# Patient Record
Sex: MALE | Race: WHITE | NOT HISPANIC OR LATINO | Employment: FULL TIME | URBAN - METROPOLITAN AREA
[De-identification: names, ages, dates, MRNs, and addresses within clinical notes are randomized per-mention and may not be internally consistent; named-entity substitution may affect disease eponyms.]

---

## 2017-01-26 ENCOUNTER — COMMUNICATION - HEALTHEAST (OUTPATIENT)
Dept: FAMILY MEDICINE | Facility: CLINIC | Age: 47
End: 2017-01-26

## 2017-02-22 ENCOUNTER — COMMUNICATION - HEALTHEAST (OUTPATIENT)
Dept: FAMILY MEDICINE | Facility: CLINIC | Age: 47
End: 2017-02-22

## 2017-04-06 ENCOUNTER — COMMUNICATION - HEALTHEAST (OUTPATIENT)
Dept: FAMILY MEDICINE | Facility: CLINIC | Age: 47
End: 2017-04-06

## 2017-04-06 DIAGNOSIS — I10 ESSENTIAL HYPERTENSION: ICD-10-CM

## 2017-04-18 ENCOUNTER — COMMUNICATION - HEALTHEAST (OUTPATIENT)
Dept: SCHEDULING | Facility: CLINIC | Age: 47
End: 2017-04-18

## 2017-04-18 DIAGNOSIS — I10 ESSENTIAL HYPERTENSION: ICD-10-CM

## 2017-05-17 ENCOUNTER — COMMUNICATION - HEALTHEAST (OUTPATIENT)
Dept: FAMILY MEDICINE | Facility: CLINIC | Age: 47
End: 2017-05-17

## 2017-05-17 DIAGNOSIS — I10 ESSENTIAL HYPERTENSION: ICD-10-CM

## 2017-06-01 NOTE — PATIENT INSTRUCTIONS
ANIBALI: JOANIE WILL BE OUT OF THE CLINIC FROM [place] 15 THROUGH JULY 4.  Any calls/Mychart messages, refill requests, and urgent lab results will be forwarded to her colleagues in her absence.

## 2017-06-02 ENCOUNTER — OFFICE VISIT (OUTPATIENT)
Dept: FAMILY MEDICINE | Facility: CLINIC | Age: 47
End: 2017-06-02
Payer: COMMERCIAL

## 2017-06-02 VITALS
WEIGHT: 227 LBS | HEIGHT: 70 IN | HEART RATE: 87 BPM | TEMPERATURE: 98.9 F | OXYGEN SATURATION: 97 % | DIASTOLIC BLOOD PRESSURE: 88 MMHG | SYSTOLIC BLOOD PRESSURE: 134 MMHG | BODY MASS INDEX: 32.5 KG/M2

## 2017-06-02 DIAGNOSIS — E11.9 TYPE 2 DIABETES MELLITUS WITHOUT COMPLICATION, WITHOUT LONG-TERM CURRENT USE OF INSULIN (H): ICD-10-CM

## 2017-06-02 DIAGNOSIS — I10 ESSENTIAL HYPERTENSION WITH GOAL BLOOD PRESSURE LESS THAN 140/90: ICD-10-CM

## 2017-06-02 DIAGNOSIS — E78.5 HYPERLIPIDEMIA LDL GOAL <100: Primary | ICD-10-CM

## 2017-06-02 DIAGNOSIS — R22.9 SUBCUTANEOUS MASS: ICD-10-CM

## 2017-06-02 PROCEDURE — 99207 C FOOT EXAM  NO CHARGE: CPT | Performed by: PHYSICIAN ASSISTANT

## 2017-06-02 PROCEDURE — 99203 OFFICE O/P NEW LOW 30 MIN: CPT | Performed by: PHYSICIAN ASSISTANT

## 2017-06-02 RX ORDER — TRIAMTERENE/HYDROCHLOROTHIAZID 37.5-25 MG
1 TABLET ORAL DAILY
COMMUNITY
Start: 2017-05-18 | End: 2017-06-02

## 2017-06-02 RX ORDER — TRIAMTERENE/HYDROCHLOROTHIAZID 37.5-25 MG
1 TABLET ORAL DAILY
Qty: 90 TABLET | Refills: 3 | Status: SHIPPED | OUTPATIENT
Start: 2017-06-02 | End: 2018-08-23

## 2017-06-02 RX ORDER — ATORVASTATIN CALCIUM 20 MG/1
20 TABLET, FILM COATED ORAL DAILY
COMMUNITY
Start: 2017-05-18 | End: 2017-06-02

## 2017-06-02 RX ORDER — ATORVASTATIN CALCIUM 20 MG/1
20 TABLET, FILM COATED ORAL DAILY
Qty: 90 TABLET | Refills: 3 | Status: SHIPPED | OUTPATIENT
Start: 2017-06-02 | End: 2018-08-17

## 2017-06-02 NOTE — NURSING NOTE
"Chief Complaint   Patient presents with     Recheck Medication       Initial BP (!) 148/95  Pulse 87  Temp 98.9  F (37.2  C) (Oral)  Ht 5' 9.69\" (1.77 m)  Wt 227 lb (103 kg)  SpO2 97%  BMI 32.87 kg/m2 Estimated body mass index is 32.87 kg/(m^2) as calculated from the following:    Height as of this encounter: 5' 9.69\" (1.77 m).    Weight as of this encounter: 227 lb (103 kg).  Medication Reconciliation: complete     Sylvie Sanabria MA  "

## 2017-06-02 NOTE — LETTER
This is a reminder letter.     In order to ensure we are providing the best quality care, we have reviewed your chart and see that you are due for: a fasting lab only appointment.     1.   Albumin Urine  2.   TSH w/free T4 reflex  3.   Hemoglobin A1C  4. Comprehensive Metabolic Panel   5. Lipid Panel     For fasting labs please fast for at least 10 hours. You can still take your medications and have water.    We greatly appreciate the opportunity to serve you.  Thank you for trusting us with your health care.    If you are now being seen elsewhere please let us know and we will remove you from the list.    Sincerely,    The [place] Team

## 2017-06-02 NOTE — MR AVS SNAPSHOT
After Visit Summary   6/2/2017    Robert Glynn    MRN: [id number]           Patient Information     Date Of Birth          1970        Visit Information        Provider Department      6/2/2017 2:40 PM Dea Ibanez PA-C [facility] [place]        Today's Diagnoses     Hyperlipidemia LDL goal <100    -  1    Essential hypertension with goal blood pressure less than 140/90        Type 2 diabetes mellitus without complication, without long-term current use of insulin (H)          Care Instructions    FYI: DEA WILL BE OUT OF THE CLINIC FROM [place] 15 THROUGH JULY 4.  Any calls/Mychart messages, refill requests, and urgent lab results will be forwarded to her colleagues in her absence.             Follow-ups after your visit        Additional Services     OPHTHALMOLOGY ADULT REFERRAL       Your provider has referred you to: [facility]: [facility] - [place] [phone]   [email or web address]    Please be aware that coverage of these services is subject to the terms and limitations of your health insurance plan.  Call member services at your health plan with any benefit or coverage questions.      Please bring the following with you to your appointment:    (1) Any X-Rays, CTs or MRIs which have been performed.  Contact the facility where they were done to arrange for  prior to your scheduled appointment.    (2) List of current medications  (3) This referral request   (4) Any documents/labs given to you for this referral                  Future tests that were ordered for you today     Open Future Orders        Priority Expected Expires Ordered    Comprehensive metabolic panel Routine  9/2/2017 6/2/2017    Lipid panel reflex to direct LDL Routine  9/2/2017 6/2/2017    Albumin Random Urine Quantitative Routine  9/2/2017 6/2/2017    Hemoglobin A1c Routine  9/2/2017 6/2/2017            Who to contact     Normal or non-critical lab and imaging results will be  "communicated to you by Tradescapehart, letter or phone within 4 business days after the clinic has received the results. If you do not hear from us within 7 days, please contact the clinic through Kinnekt or phone. If you have a critical or abnormal lab result, we will notify you by phone as soon as possible.  Submit refill requests through [facility] or call your pharmacy and they will forward the refill request to us. Please allow 3 business days for your refill to be completed.          If you need to speak with a  for additional information , please call: [phone]             Additional Information About Your Visit        [facility] Information     [facility] lets you send messages to your doctor, view your test results, renew your prescriptions, schedule appointments and more. To sign up, go to [email or web address] . Click on \"Log in\" on the left side of the screen, which will take you to the Welcome page. Then click on \"Sign up Now\" on the right side of the page.     You will be asked to enter the access code listed below, as well as some personal information. Please follow the directions to create your username and password.     Your access code is: [email or web address]  Expires: 2017  2:48 PM     Your access code will  in 90 days. If you need help or a new code, please call your [place] clinic or [phone].        Care EveryWhere ID     This is your Care EveryWhere ID. This could be used by other organizations to access your [place] medical records  [id number]        Your Vitals Were     Pulse Temperature Height Pulse Oximetry BMI (Body Mass Index)       87 98.9  F (37.2  C) (Oral) 5' 9.69\" (1.77 m) 97% 32.87 kg/m2        Blood Pressure from Last 3 Encounters:   17 (!) 148/95    Weight from Last 3 Encounters:   17 227 lb (103 kg)              We Performed the Following     C FOOT EXAM  NO CHARGE     OPHTHALMOLOGY ADULT REFERRAL          Today's Medication Changes          These " changes are accurate as of: 6/2/17  2:48 PM.  If you have any questions, ask your nurse or doctor.               These medicines have changed or have updated prescriptions.        Dose/Directions    atorvastatin 20 MG tablet   Commonly known as:  LIPITOR   This may have changed:  how to take this   Used for:  Hyperlipidemia LDL goal <100   Changed by:  Dea Ibanez PA-C        Dose:  20 mg   Take 1 tablet (20 mg) by mouth daily   Quantity:  90 tablet   Refills:  3       triamterene-hydrochlorothiazide 37.5-25 MG per tablet   Commonly known as:  MAXZIDE-25   This may have changed:  how to take this   Used for:  Essential hypertension with goal blood pressure less than 140/90   Changed by:  Dea Ibanez PA-C        Dose:  1 tablet   Take 1 tablet by mouth daily   Quantity:  90 tablet   Refills:  3            Where to get your medicines      These medications were sent to [facility] - [id number]  [address] [address] [address]     Phone:  [phone]     atorvastatin 20 MG tablet    triamterene-hydrochlorothiazide 37.5-25 MG per tablet                Primary Care Provider Office Phone #    [facility] [phone]       No address on file        Thank you!     Thank you for choosing [facility]  for your care. Our goal is always to provide you with excellent care. Hearing back from our patients is one way we can continue to improve our services. Please take a few minutes to complete the written survey that you may receive in the mail after your visit with us. Thank you!             Your Updated Medication List - Protect others around you: Learn how to safely use, store and throw away your medicines at www.disposemymeds.org.          This list is accurate as of: 6/2/17  2:48 PM.  Always use your most recent med list.                   Brand Name Dispense Instructions for use    aspirin 81 MG tablet      Take by mouth daily        atorvastatin 20 MG tablet    LIPITOR    90 tablet    Take 1 tablet (20 mg) by mouth daily       CINNAMON PO          triamterene-hydrochlorothiazide 37.5-25 MG per tablet    MAXZIDE-25    90 tablet    Take 1 tablet by mouth daily

## 2017-06-02 NOTE — PROGRESS NOTES
SUBJECTIVE:                                                    Robert Glynn is a 46 year old male who presents to clinic today for the following health issues:    New Patient/Transfer of Care  Hyperlipidemia Follow-Up      Rate your low fat/cholesterol diet?: not monitoring fat    Taking statin?  Yes, no muscle aches from statin    Other lipid medications/supplements?:  none     Hypertension Follow-up      Outpatient blood pressures are not being checked.    Low Salt Diet: no added salt       Amount of exercise or physical activity: patient has fit bit-does 10 miles a day    Problems taking medications regularly: No    Medication side effects: none    Diet: low salt        Problem list and histories reviewed & adjusted, as indicated.  Additional history: as documented    Patient Active Problem List   Diagnosis     Type 2 diabetes mellitus without complication, without long-term current use of insulin (H)     Essential hypertension with goal blood pressure less than 140/90     Hyperlipidemia LDL goal <100     History reviewed. No pertinent surgical history.    Social History   Substance Use Topics     Smoking status: Never Smoker     Smokeless tobacco: Not on file     Alcohol use Yes      Comment: occ     Family History   Problem Relation Age of Onset     Hypertension Mother      Hypertension Father      Hypertension Maternal Grandmother      Hypertension Maternal Grandfather      Hypertension Paternal Grandmother      Hypertension Paternal Grandfather      Hypertension Brother      Hypertension Brother      Hypertension Brother            Reviewed and updated as needed this visit by clinical staff  Tobacco  Allergies  Meds  Med Hx  Surg Hx  Fam Hx  Soc Hx      Reviewed and updated as needed this visit by Provider         ROS:  Constitutional, neuro, endocrine, cardiac, integument systems are negative, except as otherwise noted.    OBJECTIVE:                                                    /88   "Pulse 87  Temp 98.9  F (37.2  C) (Oral)  Ht 5' 9.69\" (1.77 m)  Wt 227 lb (103 kg)  SpO2 97%  BMI 32.87 kg/m2  Body mass index is 32.87 kg/(m^2).  GENERAL APPEARANCE: healthy, alert and no distress  MS: extremities normal- no gross deformities noted  DIABETIC FOOT EXAM: normal DP and PT pulses, no trophic changes or ulcerative lesions, normal sensory exam and normal monofilament exam  SKIN: 1.5cm fixed subcutaneous mass of left cheek, non tender  PSYCH: mentation appears normal and affect normal/bright       ASSESSMENT:                                                      1. Hyperlipidemia LDL goal <100    2. Essential hypertension with goal blood pressure less than 140/90    3. Type 2 diabetes mellitus without complication, without long-term current use of insulin (H)    4. Subcutaneous mass         PLAN:                                                    Labs in near future. Meds renewed, no changes. Patient is doing well overall. A1c through lab in 6 months. Follow up annually.    Will talk to radiology regarding imaging for the subQ mass on his cheek.    Patient Instructions   FYI: JOANIE WILL BE OUT OF THE CLINIC FROM [place] 15 THROUGH JULY 4.  Any calls/Mychart messages, refill requests, and urgent lab results will be forwarded to her colleagues in her absence.        The patient was in agreement with the plan today and had no questions or concerns prior to leaving the clinic.     Joanie Ibanez PA-C  [facility]    "

## 2017-06-05 DIAGNOSIS — R22.9 SUBCUTANEOUS MASS: Primary | ICD-10-CM

## 2017-06-16 ENCOUNTER — RADIANT APPOINTMENT (OUTPATIENT)
Dept: CT IMAGING | Facility: CLINIC | Age: 47
End: 2017-06-16
Attending: PHYSICIAN ASSISTANT
Payer: COMMERCIAL

## 2017-06-16 DIAGNOSIS — R22.9 SUBCUTANEOUS MASS: ICD-10-CM

## 2017-06-16 PROCEDURE — 70487 CT MAXILLOFACIAL W/DYE: CPT | Mod: TC

## 2017-06-16 RX ORDER — IOPAMIDOL 755 MG/ML
90 INJECTION, SOLUTION INTRAVASCULAR ONCE
Status: COMPLETED | OUTPATIENT
Start: 2017-06-16 | End: 2017-06-16

## 2017-06-16 RX ADMIN — IOPAMIDOL 90 ML: 755 INJECTION, SOLUTION INTRAVASCULAR at 15:01

## 2018-08-06 ENCOUNTER — OFFICE VISIT (OUTPATIENT)
Dept: ORTHOPEDICS | Facility: CLINIC | Age: 48
End: 2018-08-06
Payer: COMMERCIAL

## 2018-08-06 ENCOUNTER — RADIANT APPOINTMENT (OUTPATIENT)
Dept: GENERAL RADIOLOGY | Facility: CLINIC | Age: 48
End: 2018-08-06
Attending: PEDIATRICS
Payer: COMMERCIAL

## 2018-08-06 VITALS
HEIGHT: 71 IN | DIASTOLIC BLOOD PRESSURE: 64 MMHG | BODY MASS INDEX: 32.48 KG/M2 | WEIGHT: 232 LBS | SYSTOLIC BLOOD PRESSURE: 124 MMHG

## 2018-08-06 DIAGNOSIS — S92.912A FRACTURE OF PROXIMAL PHALANX OF TOE OF LEFT FOOT: Primary | ICD-10-CM

## 2018-08-06 DIAGNOSIS — M79.675 PAIN OF TOE OF LEFT FOOT: ICD-10-CM

## 2018-08-06 PROCEDURE — 99204 OFFICE O/P NEW MOD 45 MIN: CPT | Performed by: PEDIATRICS

## 2018-08-06 PROCEDURE — 73630 X-RAY EXAM OF FOOT: CPT | Mod: LT

## 2018-08-06 NOTE — MR AVS SNAPSHOT
"              After Visit Summary   8/6/2018    Robert Glynn    MRN: [id number]           Patient Information     Date Of Birth          1970        Visit Information        Provider Department      8/6/2018 3:00 PM Felicia Rosenthal MD [facility] [place]        Today's Diagnoses     Fracture of proximal phalanx of toe of left foot    -  1      Care Instructions    Plan:  - Today's Plan of Care:  Jani tape   Cast shoe    Follow Up: 2-3 weeks    If you have any further questions for your physician or physician s care team you can call [phone] and use option 3 to leave a voice message. Calls received during business hours will be returned same day.              Follow-ups after your visit        Who to contact     If you have questions or need follow up information about today's clinic visit or your schedule please contact [facility] [place] directly at [phone].  Normal or non-critical lab and imaging results will be communicated to you by Scannxhart, letter or phone within 4 business days after the clinic has received the results. If you do not hear from us within 7 days, please contact the clinic through Scannxhart or phone. If you have a critical or abnormal lab result, we will notify you by phone as soon as possible.  Submit refill requests through Pantry or call your pharmacy and they will forward the refill request to us. Please allow 3 business days for your refill to be completed.          Additional Information About Your Visit        Scannxhart Information     Pantry lets you send messages to your doctor, view your test results, renew your prescriptions, schedule appointments and more. To sign up, go to [email or web address] . Click on \"Log in\" on the left side of the screen, which will take you to the Welcome page. Then click on \"Sign up Now\" on the right side of the page.     You will be asked to enter the access code listed below, as well as some " "personal information. Please follow the directions to create your username and password.     Your access code is: [email or web address]  Expires: 2018  3:49 PM     Your access code will  in 90 days. If you need help or a new code, please call your [place] clinic or [phone].        Care EveryWhere ID     This is your Care EveryWhere ID. This could be used by other organizations to access your [place] medical records  [id number]        Your Vitals Were     Height BMI (Body Mass Index)                5' 11\" (1.803 m) 32.36 kg/m2           Blood Pressure from Last 3 Encounters:   18 124/64   17 134/88    Weight from Last 3 Encounters:   18 232 lb (105.2 kg)   17 227 lb (103 kg)               Primary Care Provider Office Phone # Fax #    [facility] [phone] [address] [address]        Equal Access to Services     [facility]: Hadii aad ku hadasho Soomaali, waaxda luqadaha, qaybta kaalmada adeegyada, waxay idiin hayaan lamonte lobato . So [facility] [phone].    ATENCIÓN: Si habla español, tiene a sorensen disposición servicios gratuitos de asistencia lingüística. Llame al [phone].    We comply with applicable federal civil rights laws and [place] laws. We do not discriminate on the basis of race, color, national origin, age, disability, sex, sexual orientation, or gender identity.            Thank you!     Thank you for choosing [facility]  for your care. Our goal is always to provide you with excellent care. Hearing back from our patients is one way we can continue to improve our services. Please take a few minutes to complete the written survey that you may receive in the mail after your visit with us. Thank you!             Your Updated Medication List - Protect others around you: Learn how to safely use, store and throw away your medicines at www.disposemymeds.org.          This list is " accurate as of 8/6/18  3:49 PM.  Always use your most recent med list.                   Brand Name Dispense Instructions for use Diagnosis    aspirin 81 MG tablet      Take by mouth daily        atorvastatin 20 MG tablet    LIPITOR    90 tablet    Take 1 tablet (20 mg) by mouth daily    Hyperlipidemia LDL goal <100       CINNAMON PO           triamterene-hydrochlorothiazide 37.5-25 MG per tablet    MAXZIDE-25    90 tablet    Take 1 tablet by mouth daily    Essential hypertension with goal blood pressure less than 140/90

## 2018-08-06 NOTE — PROGRESS NOTES
Sports Medicine Clinic Visit    PCP: Anup Shaver    Robert LIVE Glynn is a 47 year old male who is seen  as a self referral presenting with left 2nd toe injury.    Injury: He hit toe on molding at home 2 days ago.  Has continued to walk on it, much worse with barefoot.  Has significant swelling and bruising.    Location of Pain: left 2nd toe  Duration of Pain: 8/4/2018 or 2 day(s)  Rating of Pain at worst: 10/10  Rating of Pain Currently: 7/10  Symptoms are better with: wearing shoes  Symptoms are worse with: flexion  Additional Features:   Positive: swelling and bruising   Negative: catching, locking, instability, paresthesias, numbness and weakness  Other evaluation and/or treatments so far consists of: Ice and Aspirin  Prior History of related problems: none    Social History: printing company, standing all day    Review of Systems  Skin: yes bruising, yes swelling  Musculoskeletal: as above  Neurologic: no numbness, paresthesias  Remainder of review of systems is negative including constitutional, CV, pulmonary, GI, except as noted in HPI or medical history.    Patient's current problem list, past medical and surgical history, and family history were reviewed.    Patient Active Problem List   Diagnosis     Type 2 diabetes mellitus without complication, without long-term current use of insulin (H)     Essential hypertension with goal blood pressure less than 140/90     Hyperlipidemia LDL goal <100     No past medical history on file.  No past surgical history on file.  Family History   Problem Relation Age of Onset     Hypertension Mother      Hypertension Father      Hypertension Maternal Grandmother      Hypertension Maternal Grandfather      Hypertension Paternal Grandmother      Hypertension Paternal Grandfather      Hypertension Brother      Hypertension Brother      Hypertension Brother          Objective  /64 (BP Location: Right arm, Patient Position: Chair, Cuff Size: Adult Regular)  Ht 5'  "11\" (1.803 m)  Wt 232 lb (105.2 kg)  BMI 32.36 kg/m2    GENERAL APPEARANCE: healthy, alert and no distress   GAIT: antalgic  SKIN: no suspicious lesions or rashes  HEENT: Sclera clear, anicteric  CV: good peripheral pulses  RESP: Breathing not labored  NEURO: Normal strength and tone, mentation intact and speech normal  PSYCH:  mentation appears normal and affect normal/bright    Bilateral Foot and Ankle Exam:  Inspection:       ecchymosis noted left 2nd - 4th toes       edema noted left 2nd - 4th toes    Foot inspection:       no deformity noted    Tender:       2nd - 4th MTP joints, most tenderness in 2nd proximal phalans    Non-Tender:       remainder of ankle and foot bilateral    ROM:        Decreased motion of left toes due to pain and swelling, normal ankle ROM    Gait:       antalgic gait    Neurovascular:       2+ peripheral pulses bilaterally and brisk capillary refill       sensation grossly intact    Radiology  I ordered, visualized and reviewed these images with the patient  Xr Foot Left G/e 3 Views  Result Date: 8/6/2018  XR FOOT LT G/E 3 VW   8/6/2018 3:20 PM HISTORY: ; Pain of toe of left foot COMPARISON: None.   IMPRESSION: Obliquely oriented mildly displaced fracture midportion proximal phalanx second toe. CAROL ANN CURRY MD    Assessment:  1. Fracture of proximal phalanx of toe of left foot      Discussed supportive care with domenico taping, cast shoe, rest, ice, elevation.  Follow-up in 2-3 weeks for repeat x-rays.    Plan:  - Today's Plan of Care:  Domenico tape   Cast shoe    Follow Up: 2-3 weeks    Concerning signs and symptoms were reviewed.  The patient expressed understanding of this management plan and all questions were answered at this time.    Felicia Rosenthal MD CAQ  Primary Care Sports Medicine  [facility]  "

## 2018-08-06 NOTE — LETTER
8/6/2018         RE: Robert Glynn  [address]  [place] [address]        Dear Colleague,    Thank you for referring your patient, Robert Glynn, to the [facility] [place]. Please see a copy of my visit note below.    Sports Medicine Clinic Visit    PCP: Clinic, Anup Marin    Robert Glynn is a 47 year old male who is seen  as a self referral presenting with left 2nd toe injury.    Injury: He hit toe on molding at home 2 days ago.  Has continued to walk on it, much worse with barefoot.  Has significant swelling and bruising.    Location of Pain: left 2nd toe  Duration of Pain: 8/4/2018 or 2 day(s)  Rating of Pain at worst: 10/10  Rating of Pain Currently: 7/10  Symptoms are better with: wearing shoes  Symptoms are worse with: flexion  Additional Features:   Positive: swelling and bruising   Negative: catching, locking, instability, paresthesias, numbness and weakness  Other evaluation and/or treatments so far consists of: Ice and Aspirin  Prior History of related problems: none    Social History: printing company, standing all day    Review of Systems  Skin: yes bruising, yes swelling  Musculoskeletal: as above  Neurologic: no numbness, paresthesias  Remainder of review of systems is negative including constitutional, CV, pulmonary, GI, except as noted in HPI or medical history.    Patient's current problem list, past medical and surgical history, and family history were reviewed.    Patient Active Problem List   Diagnosis     Type 2 diabetes mellitus without complication, without long-term current use of insulin (H)     Essential hypertension with goal blood pressure less than 140/90     Hyperlipidemia LDL goal <100     No past medical history on file.  No past surgical history on file.  Family History   Problem Relation Age of Onset     Hypertension Mother      Hypertension Father      Hypertension Maternal Grandmother      Hypertension Maternal Grandfather      Hypertension  "Paternal Grandmother      Hypertension Paternal Grandfather      Hypertension Brother      Hypertension Brother      Hypertension Brother          Objective  /64 (BP Location: Right arm, Patient Position: Chair, Cuff Size: Adult Regular)  Ht 5' 11\" (1.803 m)  Wt 232 lb (105.2 kg)  BMI 32.36 kg/m2    GENERAL APPEARANCE: healthy, alert and no distress   GAIT: antalgic  SKIN: no suspicious lesions or rashes  HEENT: Sclera clear, anicteric  CV: good peripheral pulses  RESP: Breathing not labored  NEURO: Normal strength and tone, mentation intact and speech normal  PSYCH:  mentation appears normal and affect normal/bright    Bilateral Foot and Ankle Exam:  Inspection:       ecchymosis noted left 2nd - 4th toes       edema noted left 2nd - 4th toes    Foot inspection:       no deformity noted    Tender:       2nd - 4th MTP joints, most tenderness in 2nd proximal phalans    Non-Tender:       remainder of ankle and foot bilateral    ROM:        Decreased motion of left toes due to pain and swelling, normal ankle ROM    Gait:       antalgic gait    Neurovascular:       2+ peripheral pulses bilaterally and brisk capillary refill       sensation grossly intact    Radiology  I ordered, visualized and reviewed these images with the patient  Xr Foot Left G/e 3 Views  Result Date: 8/6/2018  XR FOOT LT G/E 3 VW   8/6/2018 3:20 PM HISTORY: ; Pain of toe of left foot COMPARISON: None.   IMPRESSION: Obliquely oriented mildly displaced fracture midportion proximal phalanx second toe. CAROL ANN CURRY MD    Assessment:  1. Fracture of proximal phalanx of toe of left foot      Discussed supportive care with domenico taping, cast shoe, rest, ice, elevation.  Follow-up in 2-3 weeks for repeat x-rays.    Plan:  - Today's Plan of Care:  Domenico tape   Cast shoe    Follow Up: 2-3 weeks    Concerning signs and symptoms were reviewed.  The patient expressed understanding of this management plan and all questions were answered at this " time.    Felicia Rosenthal MD CA  Primary Care Sports Medicine  [facility]    Again, thank you for allowing me to participate in the care of your patient.        Sincerely,        Felicia Rosenthal MD

## 2018-08-06 NOTE — PATIENT INSTRUCTIONS
Plan:  - Today's Plan of Care:  Jani tape   Cast shoe    Follow Up: 2-3 weeks    If you have any further questions for your physician or physician s care team you can call [phone] and use option 3 to leave a voice message. Calls received during business hours will be returned same day.

## 2018-08-17 DIAGNOSIS — E78.5 HYPERLIPIDEMIA LDL GOAL <100: ICD-10-CM

## 2018-08-20 RX ORDER — ATORVASTATIN CALCIUM 20 MG/1
TABLET, FILM COATED ORAL
Qty: 30 TABLET | Refills: 0 | Status: SHIPPED | OUTPATIENT
Start: 2018-08-20 | End: 2018-09-25

## 2018-08-20 NOTE — TELEPHONE ENCOUNTER
"Requested Prescriptions   Pending Prescriptions Disp Refills     atorvastatin (LIPITOR) 20 MG tablet [Pharmacy Med Name: ATORVASTATIN CALCIUM 20MG TABS] 90 tablet 3    Last Written Prescription Date:  05/08/18  Last Fill Quantity: 90,  # refills: 3   Last office visit: 6/2/2017 with prescribing provider:  ADRIAN Ibanez   Future Office Visit:   Next 5 appointments (look out 90 days)     Aug 23, 2018  3:00 PM CDT   Return Visit with Felicia Rosenthal MD   [facility] [place] ([facility] [place])    [address]  [place] [address]   [phone]                  Sig: TAKE ONE TABLET BY MOUTH EVERY DAY    Statins Protocol Failed    8/17/2018  4:30 PM       Failed - LDL on file in past 12 months    No lab results found.         Failed - Recent (12 mo) or future (30 days) visit within the authorizing provider's specialty    Patient had office visit in the last 12 months or has a visit in the next 30 days with authorizing provider or within the authorizing provider's specialty.  See \"Patient Info\" tab in inbasket, or \"Choose Columns\" in Meds & Orders section of the refill encounter.           Passed - No abnormal creatine kinase in past 12 months    No lab results found.            Passed - Patient is age 18 or older          "

## 2018-08-20 NOTE — TELEPHONE ENCOUNTER
Routing refill request to provider for review/approval because:  Patient needs to be seen because it has been more than 1 year since last office visit.  Pended for 30 days with reminder appt due.  Kallie Vail RN

## 2018-08-23 ENCOUNTER — RADIANT APPOINTMENT (OUTPATIENT)
Dept: GENERAL RADIOLOGY | Facility: CLINIC | Age: 48
End: 2018-08-23
Attending: PEDIATRICS
Payer: COMMERCIAL

## 2018-08-23 ENCOUNTER — OFFICE VISIT (OUTPATIENT)
Dept: ORTHOPEDICS | Facility: CLINIC | Age: 48
End: 2018-08-23
Payer: COMMERCIAL

## 2018-08-23 VITALS
SYSTOLIC BLOOD PRESSURE: 128 MMHG | BODY MASS INDEX: 32.48 KG/M2 | DIASTOLIC BLOOD PRESSURE: 78 MMHG | HEIGHT: 71 IN | WEIGHT: 232 LBS

## 2018-08-23 DIAGNOSIS — S92.912A FRACTURE OF PROXIMAL PHALANX OF TOE OF LEFT FOOT: ICD-10-CM

## 2018-08-23 DIAGNOSIS — S92.912A FRACTURE OF PROXIMAL PHALANX OF TOE OF LEFT FOOT: Primary | ICD-10-CM

## 2018-08-23 DIAGNOSIS — I10 ESSENTIAL HYPERTENSION WITH GOAL BLOOD PRESSURE LESS THAN 140/90: ICD-10-CM

## 2018-08-23 PROCEDURE — 73630 X-RAY EXAM OF FOOT: CPT | Mod: LT

## 2018-08-23 PROCEDURE — 99213 OFFICE O/P EST LOW 20 MIN: CPT | Performed by: PEDIATRICS

## 2018-08-23 NOTE — PROGRESS NOTES
"Sports Medicine Clinic Visit - Interim History August 23, 2018      PCP: Dea Ibanez    Robert LIVE Glynn is a 47 year old male who is seen in f/u up for Fracture of proximal phalanx of toe of left foot. Since last visit on 8/6/18, patient has improved 2nd toe pain, but there continues to be significant swelling in the toe. He reports he can walk in a normal shoe with no pain, but has pain and difficulty going up and down stairs. He has returned to work with mild soreness in his toe, wearing steel toed shoes.  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    Patient's current problem list, past medical and surgical history, and family history were reviewed.    Patient Active Problem List   Diagnosis     Type 2 diabetes mellitus without complication, without long-term current use of insulin (H)     Essential hypertension with goal blood pressure less than 140/90     Hyperlipidemia LDL goal <100     No past medical history on file.  No past surgical history on file.  Family History   Problem Relation Age of Onset     Hypertension Mother      Hypertension Father      Hypertension Maternal Grandmother      Hypertension Maternal Grandfather      Hypertension Paternal Grandmother      Hypertension Paternal Grandfather      Hypertension Brother      Hypertension Brother      Hypertension Brother        Objective  Ht 5' 11\" (1.803 m)  Wt 232 lb (105.2 kg)  BMI 32.36 kg/m2    GENERAL APPEARANCE: healthy, alert and no distress   GAIT: NORMAL  SKIN: no suspicious lesions or rashes  HEENT: Sclera clear, anicteric  CV: good peripheral pulses  RESP: Breathing not labored  NEURO: Normal strength and tone, mentation intact and speech " normal  PSYCH:  mentation appears normal and affect normal/bright    Bilateral Foot and Ankle Exam:  Inspection:       ecchymosis noted left 2nd - 4th toes improved       edema noted left 2nd - 4th toes improved     Foot inspection:       no deformity noted     Tender:       2nd proximal phalanx     Non-Tender:       remainder of ankle and foot bilateral     ROM:        Decreased motion of left toes due to pain and swelling, normal ankle ROM     Gait:      normal     Neurovascular:       2+ peripheral pulses bilaterally and brisk capillary refill       sensation grossly intact    Radiology  I ordered, visualized and reviewed these images with the patient  3 XR views of right foot reviewed: stable alignment of mildly displaced fracture of proximal phalanx  - will follow official read      Assessment:  1. Fracture of proximal phalanx of toe of left foot      Discussed supportive care with buddy taping, cast shoe if needed, rest, ice, elevation.  Follow-up in 3-4 weeks if needed.    Plan:  - Today's Plan of Care:  Continue to raul a supportive shoe and buddy tape as needed    Follow Up: 3-4 weeks    Concerning signs and symptoms were reviewed.  The patient expressed understanding of this management plan and all questions were answered at this time.    Felicia Rosenthal MD CAQ  Primary Care Sports Medicine  [facility]

## 2018-08-23 NOTE — PATIENT INSTRUCTIONS
Plan:  - Today's Plan of Care:  Continue to raul a supportive shoe and buddy tape as needed    Follow Up: 2 weeks    If you have any further questions for your physician or physician s care team you can call [phone] and use option 3 to leave a voice message. Calls received during business hours will be returned same day.

## 2018-08-23 NOTE — LETTER
"    8/23/2018         RE: Robert Glynn  [address]  [place] [address]        Dear Colleague,    Thank you for referring your patient, Robert Glynn, to the [facility] [place]. Please see a copy of my visit note below.    Sports Medicine Clinic Visit - Interim History August 23, 2018      PCP: Dea Ibanez    Robert Glynn is a 47 year old male who is seen in f/u up for Fracture of proximal phalanx of toe of left foot. Since last visit on 8/6/18, patient has improved 2nd toe pain, but there continues to be significant swelling in the toe. He reports he can walk in a normal shoe with no pain, but has pain and difficulty going up and down stairs. He has returned to work with mild soreness in his toe, wearing steel toed shoes.  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    Patient's current problem list, past medical and surgical history, and family history were reviewed.    Patient Active Problem List   Diagnosis     Type 2 diabetes mellitus without complication, without long-term current use of insulin (H)     Essential hypertension with goal blood pressure less than 140/90     Hyperlipidemia LDL goal <100     No past medical history on file.  No past surgical history on file.  Family History   Problem Relation Age of Onset     Hypertension Mother      Hypertension Father      Hypertension Maternal Grandmother      Hypertension Maternal Grandfather      Hypertension Paternal Grandmother      Hypertension Paternal Grandfather      Hypertension Brother      Hypertension Brother      Hypertension Brother        Objective  Ht 5' 11\" (1.803 m)  Wt 232 lb (105.2 kg)  BMI 32.36 " kg/m2    GENERAL APPEARANCE: healthy, alert and no distress   GAIT: NORMAL  SKIN: no suspicious lesions or rashes  HEENT: Sclera clear, anicteric  CV: good peripheral pulses  RESP: Breathing not labored  NEURO: Normal strength and tone, mentation intact and speech normal  PSYCH:  mentation appears normal and affect normal/bright    Bilateral Foot and Ankle Exam:  Inspection:       ecchymosis noted left 2nd - 4th toes improved       edema noted left 2nd - 4th toes improved     Foot inspection:       no deformity noted     Tender:       2nd proximal phalanx     Non-Tender:       remainder of ankle and foot bilateral     ROM:        Decreased motion of left toes due to pain and swelling, normal ankle ROM     Gait:       normal     Neurovascular:       2+ peripheral pulses bilaterally and brisk capillary refill       sensation grossly intact    Radiology  I ordered, visualized and reviewed these images with the patient  3 XR views of right foot reviewed: stable alignment of mildly displaced fracture of proximal phalanx  - will follow official read      Assessment:  1. Fracture of proximal phalanx of toe of left foot      Discussed supportive care with buddy taping, cast shoe if needed, rest, ice, elevation.  Follow-up in 3-4 weeks if needed.    Plan:  - Today's Plan of Care:  Continue to raul a supportive shoe and buddy tape as needed    Follow Up: 3-4 weeks    Concerning signs and symptoms were reviewed.  The patient expressed understanding of this management plan and all questions were answered at this time.    Felicia Rosenthal MD [facility]  Primary Care Sports Medicine  [facility]    Again, thank you for allowing me to participate in the care of your patient.        Sincerely,        Felicia Rosenthal MD

## 2018-08-23 NOTE — MR AVS SNAPSHOT
After Visit Summary   8/23/2018    Robert Glynn    MRN: [id number]           Patient Information     Date Of Birth          1970        Visit Information        Provider Department      8/23/2018 3:00 PM Felicia Rosenthal MD [facility] [place]        Today's Diagnoses     Fracture of proximal phalanx of toe of left foot    -  1      Care Instructions        Plan:  - Today's Plan of Care:  Continue to raul a supportive shoe and buddy tape as needed    Follow Up: 2 weeks    If you have any further questions for your physician or physician s care team you can call [phone] and use option 3 to leave a voice message. Calls received during business hours will be returned same day.              Follow-ups after your visit        Who to contact     If you have questions or need follow up information about today's clinic visit or your schedule please contact [facility] [place] directly at [phone].  Normal or non-critical lab and imaging results will be communicated to you by pfwaterworkshart, letter or phone within 4 business days after the clinic has received the results. If you do not hear from us within 7 days, please contact the clinic through [employer] or phone. If you have a critical or abnormal lab result, we will notify you by phone as soon as possible.  Submit refill requests through [employer] or call your pharmacy and they will forward the refill request to us. Please allow 3 business days for your refill to be completed.          Additional Information About Your Visit        [employer] Information     [employer] gives you secure access to your electronic health record. If you see a primary care provider, you can also send messages to your care team and make appointments. If you have questions, please call your primary care clinic.  If you do not have a primary care provider, please call [phone] and they will assist you.        Care EveryWhere ID     This  "is your Care EveryWhere ID. This could be used by other organizations to access your [facility] medical records  [id number]        Your Vitals Were     Height BMI (Body Mass Index)                5' 11\" (1.803 m) 32.36 kg/m2           Blood Pressure from Last 3 Encounters:   08/23/18 128/78   08/06/18 124/64   06/02/17 134/88    Weight from Last 3 Encounters:   08/23/18 232 lb (105.2 kg)   08/06/18 232 lb (105.2 kg)   06/02/17 227 lb (103 kg)               Primary Care Provider Office Phone # Fax #    Dea Ibanez PA-C [phone]       [address] [place] [address]        Equal Access to Services     [facility]: Hadii aad bernardino hadasho Soomaali, waaxda luqadaha, qaybta kaalmada adeegyada, waxsergei delgado haymikala lobato . So [facility] [phone].    ATENCIÓN: Si habla español, tiene a sorensen disposición servicios gratuitos de asistencia lingüística. Yung al [phone].    We comply with applicable federal civil rights laws and [place] laws. We do not discriminate on the basis of race, color, national origin, age, disability, sex, sexual orientation, or gender identity.            Thank you!     Thank you for choosing [facility]  for your care. Our goal is always to provide you with excellent care. Hearing back from our patients is one way we can continue to improve our services. Please take a few minutes to complete the written survey that you may receive in the mail after your visit with us. Thank you!             Your Updated Medication List - Protect others around you: Learn how to safely use, store and throw away your medicines at www.disposemymeds.org.          This list is accurate as of 8/23/18  3:32 PM.  Always use your most recent med list.                   Brand Name Dispense Instructions for use Diagnosis    aspirin 81 MG tablet      Take by mouth daily        atorvastatin 20 MG tablet    LIPITOR    30 tablet    TAKE ONE TABLET BY " MOUTH EVERY DAY    Hyperlipidemia LDL goal <100       CINNAMON PO           triamterene-hydrochlorothiazide 37.5-25 MG per tablet    MAXZIDE-25    90 tablet    Take 1 tablet by mouth daily    Essential hypertension with goal blood pressure less than 140/90

## 2018-08-23 NOTE — TELEPHONE ENCOUNTER
"Requested Prescriptions   Pending Prescriptions Disp Refills     triamterene-hydrochlorothiazide (MAXZIDE-25) 37.5-25 MG per tablet [Pharmacy Med Name: TRIAMTERENE-HCTZ 37.5-25MG TABS] 90 tablet 3    Last Written Prescription Date:  05/09/*18  Last Fill Quantity: 90,  # refills: 3   Last office visit: 6/2/2017 with prescribing provider:  ADRIAN Ibanez Future Office Visit:     Sig: TAKE ONE TABLET BY MOUTH EVERY DAY    Diuretics (Including Combos) Protocol Failed    8/23/2018  2:49 PM       Failed - Recent (12 mo) or future (30 days) visit within the authorizing provider's specialty    Patient had office visit in the last 12 months or has a visit in the next 30 days with authorizing provider or within the authorizing provider's specialty.  See \"Patient Info\" tab in inbasket, or \"Choose Columns\" in Meds & Orders section of the refill encounter.           Failed - Normal serum creatinine on file in past 12 months    No lab results found.          Failed - Normal serum potassium on file in past 12 months    No lab results found.                Failed - Normal serum sodium on file in past 12 months    No lab results found.          Passed - Blood pressure under 140/90 in past 12 months    BP Readings from Last 3 Encounters:   08/23/18 128/78   08/06/18 124/64   06/02/17 134/88                Passed - Patient is age 18 or older          "

## 2018-08-24 RX ORDER — TRIAMTERENE/HYDROCHLOROTHIAZID 37.5-25 MG
1 TABLET ORAL DAILY
Qty: 30 TABLET | Refills: 0 | Status: SHIPPED | OUTPATIENT
Start: 2018-08-24 | End: 2018-09-25

## 2018-08-24 NOTE — TELEPHONE ENCOUNTER
Routing refill request to provider for review/approval because:  Labs not current:  Pended for 1 month with reminder appt due

## 2018-09-25 ENCOUNTER — OFFICE VISIT (OUTPATIENT)
Dept: FAMILY MEDICINE | Facility: CLINIC | Age: 48
End: 2018-09-25
Payer: COMMERCIAL

## 2018-09-25 VITALS
TEMPERATURE: 98.3 F | BODY MASS INDEX: 32.99 KG/M2 | SYSTOLIC BLOOD PRESSURE: 160 MMHG | HEIGHT: 70 IN | DIASTOLIC BLOOD PRESSURE: 94 MMHG | WEIGHT: 230.4 LBS | OXYGEN SATURATION: 95 % | RESPIRATION RATE: 20 BRPM | HEART RATE: 105 BPM

## 2018-09-25 DIAGNOSIS — Z23 NEED FOR PROPHYLACTIC VACCINATION AGAINST STREPTOCOCCUS PNEUMONIAE (PNEUMOCOCCUS): ICD-10-CM

## 2018-09-25 DIAGNOSIS — Z00.01 ENCOUNTER FOR ROUTINE ADULT HEALTH EXAMINATION WITH ABNORMAL FINDINGS: Primary | ICD-10-CM

## 2018-09-25 DIAGNOSIS — Z13.5 SCREENING FOR DIABETIC RETINOPATHY: ICD-10-CM

## 2018-09-25 DIAGNOSIS — Z13.89 SCREENING FOR DIABETIC PERIPHERAL NEUROPATHY: ICD-10-CM

## 2018-09-25 DIAGNOSIS — Z23 NEED FOR PROPHYLACTIC VACCINATION WITH TETANUS-DIPHTHERIA (TD): ICD-10-CM

## 2018-09-25 DIAGNOSIS — E78.5 HYPERLIPIDEMIA LDL GOAL <100: ICD-10-CM

## 2018-09-25 DIAGNOSIS — I10 ESSENTIAL HYPERTENSION WITH GOAL BLOOD PRESSURE LESS THAN 140/90: ICD-10-CM

## 2018-09-25 DIAGNOSIS — E11.9 TYPE 2 DIABETES MELLITUS WITHOUT COMPLICATION, WITHOUT LONG-TERM CURRENT USE OF INSULIN (H): ICD-10-CM

## 2018-09-25 LAB
ALBUMIN SERPL-MCNC: 3.9 G/DL (ref 3.4–5)
ALP SERPL-CCNC: 52 U/L (ref 40–150)
ALT SERPL W P-5'-P-CCNC: 55 U/L (ref 0–70)
ANION GAP SERPL CALCULATED.3IONS-SCNC: 9 MMOL/L (ref 3–14)
AST SERPL W P-5'-P-CCNC: 42 U/L (ref 0–45)
BILIRUB SERPL-MCNC: 1.1 MG/DL (ref 0.2–1.3)
BUN SERPL-MCNC: 17 MG/DL (ref 7–30)
CALCIUM SERPL-MCNC: 9 MG/DL (ref 8.5–10.1)
CHLORIDE SERPL-SCNC: 104 MMOL/L (ref 94–109)
CHOLEST SERPL-MCNC: 206 MG/DL
CO2 SERPL-SCNC: 27 MMOL/L (ref 20–32)
CREAT SERPL-MCNC: 0.88 MG/DL (ref 0.66–1.25)
CREAT UR-MCNC: 382 MG/DL
GFR SERPL CREATININE-BSD FRML MDRD: >90 ML/MIN/1.7M2
GLUCOSE SERPL-MCNC: 155 MG/DL (ref 70–99)
HBA1C MFR BLD: 6 % (ref 0–5.6)
HDLC SERPL-MCNC: 53 MG/DL
LDLC SERPL CALC-MCNC: 104 MG/DL
MICROALBUMIN UR-MCNC: 137 MG/L
MICROALBUMIN/CREAT UR: 35.86 MG/G CR (ref 0–17)
NONHDLC SERPL-MCNC: 153 MG/DL
POTASSIUM SERPL-SCNC: 3.9 MMOL/L (ref 3.4–5.3)
PROT SERPL-MCNC: 7.9 G/DL (ref 6.8–8.8)
SODIUM SERPL-SCNC: 140 MMOL/L (ref 133–144)
TRIGL SERPL-MCNC: 244 MG/DL
TSH SERPL DL<=0.005 MIU/L-ACNC: 1.25 MU/L (ref 0.4–4)

## 2018-09-25 PROCEDURE — 90471 IMMUNIZATION ADMIN: CPT | Performed by: PHYSICIAN ASSISTANT

## 2018-09-25 PROCEDURE — 90715 TDAP VACCINE 7 YRS/> IM: CPT | Performed by: PHYSICIAN ASSISTANT

## 2018-09-25 PROCEDURE — 90472 IMMUNIZATION ADMIN EACH ADD: CPT | Performed by: PHYSICIAN ASSISTANT

## 2018-09-25 PROCEDURE — 80053 COMPREHEN METABOLIC PANEL: CPT | Performed by: PHYSICIAN ASSISTANT

## 2018-09-25 PROCEDURE — 82043 UR ALBUMIN QUANTITATIVE: CPT | Performed by: PHYSICIAN ASSISTANT

## 2018-09-25 PROCEDURE — 99207 C FOOT EXAM  NO CHARGE: CPT | Mod: 25 | Performed by: PHYSICIAN ASSISTANT

## 2018-09-25 PROCEDURE — 90670 PCV13 VACCINE IM: CPT | Performed by: PHYSICIAN ASSISTANT

## 2018-09-25 PROCEDURE — 83036 HEMOGLOBIN GLYCOSYLATED A1C: CPT | Performed by: PHYSICIAN ASSISTANT

## 2018-09-25 PROCEDURE — 80061 LIPID PANEL: CPT | Performed by: PHYSICIAN ASSISTANT

## 2018-09-25 PROCEDURE — 36415 COLL VENOUS BLD VENIPUNCTURE: CPT | Performed by: PHYSICIAN ASSISTANT

## 2018-09-25 PROCEDURE — 84443 ASSAY THYROID STIM HORMONE: CPT | Performed by: PHYSICIAN ASSISTANT

## 2018-09-25 PROCEDURE — 99396 PREV VISIT EST AGE 40-64: CPT | Mod: 25 | Performed by: PHYSICIAN ASSISTANT

## 2018-09-25 RX ORDER — ATORVASTATIN CALCIUM 20 MG/1
20 TABLET, FILM COATED ORAL DAILY
Qty: 90 TABLET | Refills: 1 | Status: SHIPPED | OUTPATIENT
Start: 2018-09-25 | End: 2019-01-09

## 2018-09-25 RX ORDER — TRIAMTERENE/HYDROCHLOROTHIAZID 37.5-25 MG
1 TABLET ORAL DAILY
Qty: 90 TABLET | Refills: 1 | Status: SHIPPED | OUTPATIENT
Start: 2018-09-25 | End: 2019-01-09

## 2018-09-25 NOTE — PROGRESS NOTES
SUBJECTIVE:   CC: Robert Glynn is an 48 year old male who presents for preventative health visit.     Healthy Habits:    Do you get at least three servings of calcium containing foods daily (dairy, green leafy vegetables, etc.)? yes    Amount of exercise or daily activities, outside of work: 7 day(s) per week    Problems taking medications regularly No    Medication side effects: No    Have you had an eye exam in the past two years? yes    Do you see a dentist twice per year? yes    Do you have sleep apnea, excessive snoring or daytime drowsiness?yes-Excessive snoring       PROBLEMS TO ADD ON...  Patient informed that anything we discuss that is not related to preventative medicine, may be billed for; patient verbalizes understanding.  None  -------------------------------------    Today's PHQ-2 Score:   PHQ-2 ( 1999 Pfizer) 9/25/2018   Q1: Little interest or pleasure in doing things 0   Q2: Feeling down, depressed or hopeless 0   PHQ-2 Score 0       Abuse: Current or Past(Physical, Sexual or Emotional)- No  Do you feel safe in your environment - Yes    Social History   Substance Use Topics     Smoking status: Never Smoker     Smokeless tobacco: Never Used     Alcohol use Yes      Comment: occ      If you drink alcohol do you typically have >3 drinks per day or >7 drinks per week? Yes - AUDIT SCORE:  7  AUDIT - Alcohol Use Disorders Identification Test - Reproduced from the World Health Organization Audit 2001 (Second Edition) 9/25/2018   1.  How often do you have a drink containing alcohol? 4 or more times a week   2.  How many drinks containing alcohol do you have on a typical day when you are drinking? 1 or 2   3.  How often do you have five or more drinks on one occasion? Weekly   4.  How often during the last year have you found that you were not able to stop drinking once you had started? Never   5.  How often during the last year have you failed to do what was normally expected of you because of drinking?  "Never   6.  How often during the last year have you needed a first drink in the morning to get yourself going after a heavy drinking session? Never   7.  How often during the last year have you had a feeling of guilt or remorse after drinking? Never   8.  How often during the last year have you been unable to remember what happened the night before because of your drinking? Never   9.  Have you or someone else been injured because of your drinking? No   10. Has a relative, friend, doctor or other health care worker been concerned about your drinking or suggested you cut down? No   TOTAL SCORE 7                         Last PSA: No results found for: PSA    Reviewed orders with patient. Reviewed health maintenance and updated orders accordingly - Yes  Labs reviewed in EPIC    Reviewed and updated as needed this visit by clinical staff  Tobacco  Allergies  Meds  Problems         Reviewed and updated as needed this visit by Provider  Problems        No past medical history on file.     ROS:  Other than what is noted in the HPI and PMH a complete review of systems is otherwise negative including: Constitutional, HEENT, endocrine, cardiovascular, respiratory, GI/, musculoskeletal, neuro, and psychiatric.     OBJECTIVE:   BP (!) 160/94  Pulse 105  Temp 98.3  F (36.8  C) (Tympanic)  Resp 20  Ht 5' 10.16\" (1.782 m)  Wt 230 lb 6.4 oz (104.5 kg)  SpO2 95%  BMI 32.91 kg/m2  EXAM:  GENERAL: healthy, alert and no distress  EYES: Eyes grossly normal to inspection, PERRL and conjunctivae and sclerae normal  HENT: ear canals and TM's normal, nose and mouth without ulcers or lesions  NECK: no adenopathy, no asymmetry, masses, or scars and thyroid normal to palpation  RESP: lungs clear to auscultation - no rales, rhonchi or wheezes  CV: regular rate and rhythm, normal S1 S2, no S3 or S4, no murmur, click or rub, no peripheral edema and peripheral pulses strong  ABDOMEN: soft, nontender, no hepatosplenomegaly, no masses " "and bowel sounds normal  MS: no gross musculoskeletal defects noted, no edema  SKIN: no suspicious lesions or rashes  NEURO: Normal strength and tone, mentation intact and speech normal  PSYCH: mentation appears normal, affect normal/bright  Diabetic foot exam: normal DP and PT pulses, no trophic changes or ulcerative lesions, normal sensory exam and normal monofilament exam    ASSESSMENT/PLAN:       ICD-10-CM    1. Encounter for routine adult health examination with abnormal findings Z00.01    2. Screening for diabetic retinopathy Z13.5 OPHTHALMOLOGY ADULT REFERRAL   3. Screening for diabetic peripheral neuropathy Z13.89 FOOT EXAM  NO CHARGE [46202.114]   4. Need for prophylactic vaccination against Streptococcus pneumoniae (pneumococcus) Z23 Pneumococcal vaccine 13 valent PCV13 IM (Prevnar) [34713]   5. Need for prophylactic vaccination with tetanus-diphtheria (TD) Z23 TDAP VACCINE (ADACEL)   6. Type 2 diabetes mellitus without complication, without long-term current use of insulin (H) E11.9 Hemoglobin A1c     Comprehensive metabolic panel     Albumin Random Urine Quantitative     TSH with free T4 reflex   7. Essential hypertension with goal blood pressure less than 140/90 I10 Comprehensive metabolic panel     Albumin Random Urine Quantitative     triamterene-hydrochlorothiazide (MAXZIDE-25) 37.5-25 MG per tablet   8. Hyperlipidemia LDL goal <100 E78.5 Lipid panel reflex to direct LDL     atorvastatin (LIPITOR) 20 MG tablet       Returning in 2 weeks for a blood pressure recheck with ancillary.  Meds renewed, no change. Routine labs today. Screenings discussed. Follow up A1c in 6 months.       COUNSELING:  Reviewed preventive health counseling, as reflected in patient instructions    BP Readings from Last 1 Encounters:   09/25/18 (!) 160/94     Estimated body mass index is 32.91 kg/(m^2) as calculated from the following:    Height as of this encounter: 5' 10.16\" (1.782 m).    Weight as of this encounter: 230 lb " 6.4 oz (104.5 kg).     reports that he has never smoked. He has never used smokeless tobacco.      Counseling Resources:  ATP IV Guidelines  Pooled Cohorts Equation Calculator  FRAX Risk Assessment  ICSI Preventive Guidelines  Dietary Guidelines for Americans, 2010  USDA's MyPlate  ASA Prophylaxis  Lung CA Screening    Dea Ibanez PA-C  [facility]

## 2018-09-25 NOTE — NURSING NOTE
Screening Questionnaire for Adult Immunization    Are you sick today?   No   Do you have allergies to medications, food, a vaccine component or latex?   No   Have you ever had a serious reaction after receiving a vaccination?   No   Do you have a long-term health problem with heart disease, lung disease, asthma, kidney disease, metabolic disease (e.g. diabetes), anemia, or other blood disorder?   No   Do you have cancer, leukemia, HIV/AIDS, or any other immune system problem?   No   In the past 3 months, have you taken medications that affect  your immune system, such as prednisone, other steroids, or anticancer drugs; drugs for the treatment of rheumatoid arthritis, Crohn s disease, or psoriasis; or have you had radiation treatments?   No   Have you had a seizure, or a brain or other nervous system problem?   No   During the past year, have you received a transfusion of blood or blood     products, or been given immune (gamma) globulin or antiviral drug?   No   For women: Are you pregnant or is there a chance you could become        pregnant during the next month?   No   Have you received any vaccinations in the past 4 weeks?   No     Immunization questionnaire answers were all negative.        Per orders of Dea Ibanez PA-C, injection of Tdap and PCV13 given by Abdoulaye Childress. Patient instructed to remain in clinic for 15 minutes afterwards, and to report any adverse reaction to me immediately.       Screening performed by Abdoulaye Childress on 9/25/2018 at 10:12 AM.

## 2018-09-25 NOTE — MR AVS SNAPSHOT
After Visit Summary   9/25/2018    Robert Glynn    MRN: [id number]           Patient Information     Date Of Birth          1970        Visit Information        Provider Department      9/25/2018 8:40 AM Dea Ibanez PA-C [facility]        Today's Diagnoses     Encounter for routine adult health examination with abnormal findings    -  1    Screening for diabetic retinopathy        Screening for diabetic peripheral neuropathy        Need for prophylactic vaccination against Streptococcus pneumoniae (pneumococcus)        Need for prophylactic vaccination with tetanus-diphtheria (TD)        Type 2 diabetes mellitus without complication, without long-term current use of insulin (H)        Essential hypertension with goal blood pressure less than 140/90        Hyperlipidemia LDL goal <100          Care Instructions      Preventive Health Recommendations  Male Ages 40 to 49    Yearly exam:             See your health care provider every year in order to  o   Review health changes.   o   Discuss preventive care.    o   Review your medicines if your doctor has prescribed any.    You should be tested each year for STDs (sexually transmitted diseases) if you re at risk.     Have a cholesterol test every 5 years.     Have a colonoscopy (test for colon cancer) if someone in your family has had colon cancer or polyps before age 50.     After age 45, have a diabetes test (fasting glucose). If you are at risk for diabetes, you should have this test every 3 years.      Talk with your health care provider about whether or not a prostate cancer screening test (PSA) is right for you.    Shots: Get a flu shot each year. Get a tetanus shot every 10 years.     Nutrition:    Eat at least 5 servings of fruits and vegetables daily.     Eat whole-grain bread, whole-wheat pasta and brown rice instead of white grains and rice.     Get adequate Calcium and Vitamin D.     Lifestyle    Exercise for at  least 150 minutes a week (30 minutes a day, 5 days a week). This will help you control your weight and prevent disease.     Limit alcohol to one drink per day.     No smoking.     Wear sunscreen to prevent skin cancer.     See your dentist every six months for an exam and cleaning.              Follow-ups after your visit        Additional Services     OPHTHALMOLOGY ADULT REFERRAL       Your provider has referred you to: [facility]: [facility] [phone]   [email or web address]    Please be aware that coverage of these services is subject to the terms and limitations of your health insurance plan.  Call member services at your health plan with any benefit or coverage questions.      Please bring the following with you to your appointment:    (1) Any X-Rays, CTs or MRIs which have been performed.  Contact the facility where they were done to arrange for  prior to your scheduled appointment.    (2) List of current medications  (3) This referral request   (4) Any documents/labs given to you for this referral                  Who to contact     Normal or non-critical lab and imaging results will be communicated to you by [employer], letter or phone within 4 business days after the clinic has received the results. If you do not hear from us within 7 days, please contact the clinic through [employer] or phone. If you have a critical or abnormal lab result, we will notify you by phone as soon as possible.  Submit refill requests through [employer] or call your pharmacy and they will forward the refill request to us. Please allow 3 business days for your refill to be completed.          If you need to speak with a  for additional information , please call: [phone]             Additional Information About Your Visit        [employer] Information     [employer] gives you secure access to your electronic health record. If you see a primary care provider, you can also send  "messages to your care team and make appointments. If you have questions, please call your primary care clinic.  If you do not have a primary care provider, please call [phone] and they will assist you.        Care EveryWhere ID     This is your Care EveryWhere ID. This could be used by other organizations to access your [place] medical records  [id number]        Your Vitals Were     Pulse Temperature Respirations Height Pulse Oximetry BMI (Body Mass Index)    105 98.3  F (36.8  C) (Tympanic) 20 5' 10.16\" (1.782 m) 95% 32.91 kg/m2       Blood Pressure from Last 3 Encounters:   09/25/18 (!) 135/97   08/23/18 128/78   08/06/18 124/64    Weight from Last 3 Encounters:   09/25/18 230 lb 6.4 oz (104.5 kg)   08/23/18 232 lb (105.2 kg)   08/06/18 232 lb (105.2 kg)              We Performed the Following     Albumin Random Urine Quantitative     Comprehensive metabolic panel     Hemoglobin A1c     Lipid panel reflex to direct LDL     OPHTHALMOLOGY ADULT REFERRAL     Pneumococcal vaccine 13 valent PCV13 IM (Prevnar) [83626]     TDAP VACCINE (ADACEL)     TSH with free T4 reflex        Primary Care Provider Office Phone # Fax #    Dea Ibanez PA-C [phone] [address]  [place] [address]        Equal Access to Services     YOKO DENNIS : Hadii aad ku hadasho Soann, waaxda luqadaha, qaybta kaalmada krystle, rosa lobato . So [facility] [phone].    ATENCIÓN: Si habla español, tiene a sorensen disposición servicios gratuitos de asistencia lingüística. Yung al [phone].    We comply with applicable federal civil rights laws and [place] laws. We do not discriminate on the basis of race, color, national origin, age, disability, sex, sexual orientation, or gender identity.            Thank you!     Thank you for choosing [facility] [place]  for your care. Our goal is always to provide you with excellent care. Hearing back from our patients is one way " we can continue to improve our services. Please take a few minutes to complete the written survey that you may receive in the mail after your visit with us. Thank you!             Your Updated Medication List - Protect others around you: Learn how to safely use, store and throw away your medicines at www.disposemymeds.org.          This list is accurate as of 9/25/18  9:28 AM.  Always use your most recent med list.                   Brand Name Dispense Instructions for use Diagnosis    aspirin 81 MG tablet      Take by mouth daily        atorvastatin 20 MG tablet    LIPITOR    30 tablet    TAKE ONE TABLET BY MOUTH EVERY DAY    Hyperlipidemia LDL goal <100       CINNAMON PO           triamterene-hydrochlorothiazide 37.5-25 MG per tablet    MAXZIDE-25    30 tablet    Take 1 tablet by mouth daily - DUE FOR FOLLOW UP    Essential hypertension with goal blood pressure less than 140/90

## 2018-09-28 PROBLEM — R80.9 MICROALBUMINURIA: Status: ACTIVE | Noted: 2018-09-28

## 2018-10-23 ENCOUNTER — TELEPHONE (OUTPATIENT)
Dept: FAMILY MEDICINE | Facility: CLINIC | Age: 48
End: 2018-10-23

## 2018-10-23 NOTE — LETTER
October 31, 2018      Robert Gylnn  [address] [place]  [place] [address]        Dear Robert,     This is a reminder letter.     In order to ensure we are providing the best quality care, we have reviewed your chart and see that you are due for:    1.   Your next Ancillary visit is due for blood pressure check   2.   Eye exam  3.   Influenza vaccine    For fasting labs please fast for at least 10 hours. You can still take your medications and have water.    We greatly appreciate the opportunity to serve you.  Thank you for trusting us with your health care.    If you are now being seen elsewhere please let us know and we will remove you from the list.      Sincerely,        Dea Ibanez PA-C/Abdoulaye Childress CMA

## 2018-10-23 NOTE — TELEPHONE ENCOUNTER
Panel Management Review      Patient has the following on his problem list:     Diabetes    ASA: Passed    Last A1C  Lab Results   Component Value Date    A1C 6.0 09/25/2018     A1C tested: Passed    Last LDL:    Lab Results   Component Value Date    CHOL 206 09/25/2018     Lab Results   Component Value Date    HDL 53 09/25/2018     Lab Results   Component Value Date     09/25/2018     Lab Results   Component Value Date    TRIG 244 09/25/2018     No results found for: CHOLHDLRATIO  Lab Results   Component Value Date    NHDL 153 09/25/2018       Is the patient on a Statin? YES             Is the patient on Aspirin? YES    Medications     HMG CoA Reductase Inhibitors    atorvastatin (LIPITOR) 20 MG tablet    Salicylates    aspirin 81 MG tablet          Last three blood pressure readings:  BP Readings from Last 3 Encounters:   09/25/18 (!) 160/94   08/23/18 128/78   08/06/18 124/64       Date of last diabetes office visit: 09/25/2018     Tobacco History:     History   Smoking Status     Never Smoker   Smokeless Tobacco     Never Used         Hypertension   Last three blood pressure readings:  BP Readings from Last 3 Encounters:   09/25/18 (!) 160/94   08/23/18 128/78   08/06/18 124/64     Blood pressure: FAILED    HTN Guidelines:  Age 18-59 BP range:  Less than 140/90  Age 60-85 with Diabetes:  Less than 140/90  Age 60-85 without Diabetes:  less than 150/90      Composite cancer screening  Chart review shows that this patient is due/due soon for the following None  Summary:    Patient is due/failing the following:   BP CHECK    Action needed:   Patient needs nurse only appointment.    Type of outreach:    Phone, left message for patient to call back.     Questions for provider review:    None                                                                                                                                    Abdoulaye Childress MA     Chart routed to Care Team .

## 2018-10-26 NOTE — TELEPHONE ENCOUNTER
2nd attempt  Abdoulaye Childress contacted Robert on 10/26/18 and left a message. If patient calls back please schedule appointment as soon as possible Ancillary visit for b/p check .

## 2018-10-31 NOTE — TELEPHONE ENCOUNTER
3rd attempted  myChart message sent  Letter mailed  Patient needs an ancillary visit to check his b/p

## 2019-01-09 ENCOUNTER — MYC REFILL (OUTPATIENT)
Dept: FAMILY MEDICINE | Facility: CLINIC | Age: 49
End: 2019-01-09

## 2019-01-09 DIAGNOSIS — E78.5 HYPERLIPIDEMIA LDL GOAL <100: ICD-10-CM

## 2019-01-09 DIAGNOSIS — I10 ESSENTIAL HYPERTENSION WITH GOAL BLOOD PRESSURE LESS THAN 140/90: ICD-10-CM

## 2019-01-09 RX ORDER — ATORVASTATIN CALCIUM 20 MG/1
20 TABLET, FILM COATED ORAL DAILY
Qty: 90 TABLET | Refills: 1 | Status: SHIPPED | OUTPATIENT
Start: 2019-01-09 | End: 2019-04-09

## 2019-01-09 NOTE — TELEPHONE ENCOUNTER
Routing refill request to provider for review/approval because:  Labs out of range:  BP  BP Readings from Last 3 Encounters:   09/25/18 (!) 160/94   08/23/18 128/78   08/06/18 124/64     Tish Birmingham RN, BSN, PHN

## 2019-01-11 ENCOUNTER — ALLIED HEALTH/NURSE VISIT (OUTPATIENT)
Dept: NURSING | Facility: CLINIC | Age: 49
End: 2019-01-11
Payer: COMMERCIAL

## 2019-01-11 VITALS — HEART RATE: 88 BPM | SYSTOLIC BLOOD PRESSURE: 158 MMHG | DIASTOLIC BLOOD PRESSURE: 92 MMHG

## 2019-01-11 DIAGNOSIS — I10 ESSENTIAL HYPERTENSION WITH GOAL BLOOD PRESSURE LESS THAN 140/90: ICD-10-CM

## 2019-01-11 PROCEDURE — 99207 ZZC NO CHARGE NURSE ONLY: CPT

## 2019-01-11 RX ORDER — TRIAMTERENE/HYDROCHLOROTHIAZID 37.5-25 MG
1 TABLET ORAL DAILY
Qty: 30 TABLET | Refills: 0 | Status: SHIPPED | OUTPATIENT
Start: 2019-01-11 | End: 2019-01-11

## 2019-01-11 RX ORDER — LOSARTAN POTASSIUM 50 MG/1
50 TABLET ORAL DAILY
Qty: 90 TABLET | Refills: 0 | Status: SHIPPED | OUTPATIENT
Start: 2019-01-11 | End: 2019-04-09

## 2019-01-11 RX ORDER — TRIAMTERENE/HYDROCHLOROTHIAZID 37.5-25 MG
1 TABLET ORAL DAILY
Qty: 90 TABLET | Refills: 0 | Status: SHIPPED | OUTPATIENT
Start: 2019-01-11 | End: 2019-04-09

## 2019-01-11 NOTE — LETTER
[facility] [place]  [address] [place] [address]  [phone]        April 12, 2019    Robert Glynn  [address]  [place] [address]              Dear Robert Glynn    This is to remind you that your Basic profile is due.    You may call our office at [phone] to schedule an appointment.    Please disregard this notice if you have already had your labs drawn or made an appointment.        Sincerely,        Dea Ibanez PA-C

## 2019-01-11 NOTE — LETTER
[facility] [place]  [address] [place] [address]  [phone]        January 31, 2019    Robert Glynn  [address] [place] [address]          Dear Robert Glynn    This is to remind you that your Basic profile is due.    You may call our office at [phone] to schedule an appointment.    Please disregard this notice if you have already had your labs drawn or made an appointment.      Sincerely,      Dea Ibanez PA-C

## 2019-01-11 NOTE — PROGRESS NOTES
Robert Glynn is a 48 year old patient who comes in today for a Blood Pressure check.  Initial BP:  BP (!) 162/102   Pulse 88      Recheck:  158/92  Disposition: provider notified while patient in the clinic  Dea MARROQUIN CMA

## 2019-02-22 ENCOUNTER — TELEPHONE (OUTPATIENT)
Dept: FAMILY MEDICINE | Facility: CLINIC | Age: 49
End: 2019-02-22

## 2019-02-22 NOTE — TELEPHONE ENCOUNTER
Panel Management Review      Patient has the following on his problem list:     Diabetes    ASA: Passed    Last A1C  Lab Results   Component Value Date    A1C 6.0 09/25/2018     A1C tested: Passed    Last LDL:    Lab Results   Component Value Date    CHOL 206 09/25/2018     Lab Results   Component Value Date    HDL 53 09/25/2018     Lab Results   Component Value Date     09/25/2018     Lab Results   Component Value Date    TRIG 244 09/25/2018     No results found for: CHOLHDLRATIO  Lab Results   Component Value Date    NHDL 153 09/25/2018       Is the patient on a Statin? YES             Is the patient on Aspirin? YES    Medications     HMG CoA Reductase Inhibitors     atorvastatin (LIPITOR) 20 MG tablet       Salicylates     aspirin 81 MG tablet             Last three blood pressure readings:  BP Readings from Last 3 Encounters:   01/11/19 (!) 158/92   09/25/18 (!) 160/94   08/23/18 128/78       Date of last diabetes office visit: 09/25/2018     Tobacco History:     History   Smoking Status     Never Smoker   Smokeless Tobacco     Never Used         Hypertension   Last three blood pressure readings:  BP Readings from Last 3 Encounters:   01/11/19 (!) 158/92   09/25/18 (!) 160/94   08/23/18 128/78     Blood pressure: FAILED    HTN Guidelines:  Age 18-59 BP range:  Less than 140/90  Age 60-85 with Diabetes:  Less than 140/90  Age 60-85 without Diabetes:  less than 150/90      Composite cancer screening  Chart review shows that this patient is due/due soon for the following NoneNone  Summary:    Patient is due/failing the following:   lab and BP CHECK    Action needed:   Patient needs fasting lab only appointment and Patient needs nurse only appointment.    Type of outreach:    Phone, left message for patient to call back.     Questions for provider review:    None                                                                                                                                    Abdoulaye Childress  CMA       Chart routed to Care Team .

## 2019-02-22 NOTE — LETTER
March 18, 2019      Robert Glynn  [address] [place] [address]  [place] [address]        Dear Robert,      In order to ensure we are providing the best quality care, we have reviewed your chart and see that you are due for:    1.   Your next lab visit is due for Hgb A1c and a nurse visit for blood pressure check     For fasting labs please fast for at least 10 hours. You can still take your medications and have water.    We greatly appreciate the opportunity to serve you.  Thank you for trusting us with your health care.    If you are now being seen elsewhere please let us know and we will remove you from the list.        Sincerely,        [facility] [place] [facility]

## 2019-03-18 NOTE — TELEPHONE ENCOUNTER
3rd attempt    Abdoulaye Childress contacted Robert on 03/18/19 and left a message. If patient calls back please schedule appointment as soon as possible for lab and ancillary(blood pressure check) visit.    Letter mailed

## 2019-03-20 NOTE — TELEPHONE ENCOUNTER
Spoke with pt; needs to check his schedule- is going out of town for work.     Will be returning next week.  States he will call back to schedule appts for both of these.

## 2019-04-15 ENCOUNTER — ALLIED HEALTH/NURSE VISIT (OUTPATIENT)
Dept: NURSING | Facility: CLINIC | Age: 49
End: 2019-04-15
Payer: COMMERCIAL

## 2019-04-15 VITALS — SYSTOLIC BLOOD PRESSURE: 136 MMHG | HEART RATE: 90 BPM | DIASTOLIC BLOOD PRESSURE: 90 MMHG

## 2019-04-15 DIAGNOSIS — E11.9 TYPE 2 DIABETES MELLITUS WITHOUT COMPLICATION, WITHOUT LONG-TERM CURRENT USE OF INSULIN (H): ICD-10-CM

## 2019-04-15 DIAGNOSIS — I10 ESSENTIAL HYPERTENSION WITH GOAL BLOOD PRESSURE LESS THAN 140/90: Primary | ICD-10-CM

## 2019-04-15 DIAGNOSIS — I10 ESSENTIAL HYPERTENSION WITH GOAL BLOOD PRESSURE LESS THAN 140/90: ICD-10-CM

## 2019-04-15 LAB
ANION GAP SERPL CALCULATED.3IONS-SCNC: 11 MMOL/L (ref 3–14)
BUN SERPL-MCNC: 17 MG/DL (ref 7–30)
CALCIUM SERPL-MCNC: 9.3 MG/DL (ref 8.5–10.1)
CHLORIDE SERPL-SCNC: 105 MMOL/L (ref 94–109)
CO2 SERPL-SCNC: 24 MMOL/L (ref 20–32)
CREAT SERPL-MCNC: 0.93 MG/DL (ref 0.66–1.25)
GFR SERPL CREATININE-BSD FRML MDRD: >90 ML/MIN/{1.73_M2}
GLUCOSE SERPL-MCNC: 120 MG/DL (ref 70–99)
HBA1C MFR BLD: 6.1 % (ref 0–5.6)
POTASSIUM SERPL-SCNC: 3.9 MMOL/L (ref 3.4–5.3)
SODIUM SERPL-SCNC: 140 MMOL/L (ref 133–144)

## 2019-04-15 PROCEDURE — 36415 COLL VENOUS BLD VENIPUNCTURE: CPT | Performed by: PHYSICIAN ASSISTANT

## 2019-04-15 PROCEDURE — 83036 HEMOGLOBIN GLYCOSYLATED A1C: CPT | Performed by: PHYSICIAN ASSISTANT

## 2019-04-15 PROCEDURE — 99207 ZZC NO CHARGE NURSE ONLY: CPT

## 2019-04-15 PROCEDURE — 80048 BASIC METABOLIC PNL TOTAL CA: CPT | Performed by: PHYSICIAN ASSISTANT

## 2019-04-15 NOTE — PROGRESS NOTES
Robert Glynn is a 48 year old patient who comes in today for a Blood Pressure check.  Initial BP:  /90   Pulse 90        Disposition: results routed to provider

## 2019-07-01 DIAGNOSIS — I10 ESSENTIAL HYPERTENSION WITH GOAL BLOOD PRESSURE LESS THAN 140/90: ICD-10-CM

## 2019-07-02 NOTE — TELEPHONE ENCOUNTER
"Requested Prescriptions   Pending Prescriptions Disp Refills     losartan (COZAAR) 50 MG tablet [Pharmacy Med Name: LOSARTAN POTASSIUM 50MG TABS] 90 tablet 0     Sig: TAKE ONE TABLET BY MOUTH ONCE DAILY   Last Written Prescription Date:  4-11-19  Last Fill Quantity: 90,  # refills: 0   Last office visit: 9/25/2018 with prescribing provider:  9-25-18   Future Office Visit:      Angiotensin-II Receptors Failed - 7/1/2019  8:03 PM        Failed - Blood pressure under 140/90 in past 12 months     BP Readings from Last 3 Encounters:   04/15/19 136/90   01/11/19 (!) 158/92   09/25/18 (!) 160/94                 Passed - Recent (12 mo) or future (30 days) visit within the authorizing provider's specialty     Patient had office visit in the last 12 months or has a visit in the next 30 days with authorizing provider or within the authorizing provider's specialty.  See \"Patient Info\" tab in inbasket, or \"Choose Columns\" in Meds & Orders section of the refill encounter.              Passed - Medication is active on med list        Passed - Patient is age 18 or older        Passed - Normal serum creatinine on file in past 12 months     Recent Labs   Lab Test 04/15/19  1444   CR 0.93             Passed - Normal serum potassium on file in past 12 months     Recent Labs   Lab Test 04/15/19  1444   POTASSIUM 3.9                    "

## 2019-07-02 NOTE — TELEPHONE ENCOUNTER
Routing refill request to provider for review/approval because:  Labs out of range:  BP  From [employer] message on 5/8/19, pt increased dose to 1.5 tabs daily and was to recheck bp in 1 month- 6/8/19.   Medication dose was never changed and pt did not make appt to follow up.   Priya Ambrocio        9:28 AM   Note      Patient returned call, he would like to Increase losartan to 1.5 tabs daily. Then come in 1 month for recheck.

## 2019-07-03 NOTE — TELEPHONE ENCOUNTER
Please check in with patient.   Did he increase his dose?  Needs blood pressure recheck w ancillary

## 2019-07-08 RX ORDER — LOSARTAN POTASSIUM 50 MG/1
75 TABLET ORAL DAILY
Qty: 135 TABLET | Refills: 0 | Status: SHIPPED | OUTPATIENT
Start: 2019-07-08 | End: 2019-09-25

## 2019-07-08 NOTE — TELEPHONE ENCOUNTER
Robert states that he has been taking 1.5 tablets of Losartan 50 mg since mid May. He has scheduled a BP check (ancillary schedule) on July 11.     Patient is due for his annual physical in late September.     Updated prescription pended.     When did you want to see patient back for an appointment?

## 2019-07-11 ENCOUNTER — ALLIED HEALTH/NURSE VISIT (OUTPATIENT)
Dept: NURSING | Facility: CLINIC | Age: 49
End: 2019-07-11
Payer: COMMERCIAL

## 2019-07-11 VITALS — DIASTOLIC BLOOD PRESSURE: 82 MMHG | SYSTOLIC BLOOD PRESSURE: 132 MMHG | HEART RATE: 88 BPM | OXYGEN SATURATION: 96 %

## 2019-07-11 DIAGNOSIS — I10 ESSENTIAL HYPERTENSION WITH GOAL BLOOD PRESSURE LESS THAN 140/90: Primary | ICD-10-CM

## 2019-07-11 PROCEDURE — 99207 ZZC NO CHARGE NURSE ONLY: CPT

## 2019-07-11 NOTE — PROGRESS NOTES
Robert Glynn is a 48 year old patient who comes in today for a Blood Pressure check.  Initial BP:  /82   Pulse 88   SpO2 96%      Data Unavailable  Disposition: results routed to provider

## 2019-08-04 DIAGNOSIS — I10 ESSENTIAL HYPERTENSION WITH GOAL BLOOD PRESSURE LESS THAN 140/90: ICD-10-CM

## 2019-08-05 RX ORDER — TRIAMTERENE/HYDROCHLOROTHIAZID 37.5-25 MG
TABLET ORAL
Qty: 90 TABLET | Refills: 0 | Status: SHIPPED | OUTPATIENT
Start: 2019-08-05 | End: 2019-09-25

## 2019-08-05 NOTE — TELEPHONE ENCOUNTER
Prescription approved per [facility] Refill Protocol.  Ani Walsh RN

## 2019-08-05 NOTE — TELEPHONE ENCOUNTER
"Requested Prescriptions   Pending Prescriptions Disp Refills     triamterene-HCTZ (MAXZIDE-25) 37.5-25 MG tablet [Pharmacy Med Name: TRIAMTERENE-HCTZ 37.5-25MG TABS] 90 tablet 0     Sig: TAKE ONE TABLET BY MOUTH ONCE DAILY   Last Written Prescription Date:  4-11-19  Last Fill Quantity: 90,  # refills: 0   Last office visit: 9/25/2018 with prescribing provider:  9-25-18   Future Office Visit:      Diuretics (Including Combos) Protocol Passed - 8/4/2019  8:49 AM        Passed - Blood pressure under 140/90 in past 12 months     BP Readings from Last 3 Encounters:   07/11/19 132/82   04/15/19 136/90   01/11/19 (!) 158/92                 Passed - Recent (12 mo) or future (30 days) visit within the authorizing provider's specialty     Patient had office visit in the last 12 months or has a visit in the next 30 days with authorizing provider or within the authorizing provider's specialty.  See \"Patient Info\" tab in inbasket, or \"Choose Columns\" in Meds & Orders section of the refill encounter.              Passed - Medication is active on med list        Passed - Patient is age 18 or older        Passed - Normal serum creatinine on file in past 12 months     Recent Labs   Lab Test 04/15/19  1444   CR 0.93              Passed - Normal serum potassium on file in past 12 months     Recent Labs   Lab Test 04/15/19  1444   POTASSIUM 3.9                    Passed - Normal serum sodium on file in past 12 months     Recent Labs   Lab Test 04/15/19  1444                 "

## 2019-10-24 ENCOUNTER — TELEPHONE (OUTPATIENT)
Dept: FAMILY MEDICINE | Facility: CLINIC | Age: 49
End: 2019-10-24

## 2019-10-24 DIAGNOSIS — E78.5 HYPERLIPIDEMIA LDL GOAL <100: ICD-10-CM

## 2019-10-24 DIAGNOSIS — I10 ESSENTIAL HYPERTENSION WITH GOAL BLOOD PRESSURE LESS THAN 140/90: ICD-10-CM

## 2019-10-25 RX ORDER — ATORVASTATIN CALCIUM 20 MG/1
TABLET, FILM COATED ORAL
Qty: 30 TABLET | Refills: 0 | Status: SHIPPED | OUTPATIENT
Start: 2019-10-25 | End: 2020-01-14

## 2019-10-25 NOTE — TELEPHONE ENCOUNTER
Patient due for physical.   30 day scott period fill pended for provider approval with reminder to patient to schedule appointment for further refills.   Please advise on scott refill.     Tish Rogers, RN, BSN, PHN

## 2019-10-25 NOTE — TELEPHONE ENCOUNTER
"Requested Prescriptions   Pending Prescriptions Disp Refills     atorvastatin (LIPITOR) 20 MG tablet [Pharmacy Med Name: ATORVASTATIN CALCIUM 20MG TABS]  Last Written Prescription Date:  08/05/19  Last Fill Quantity: 90,  # refills: 1   Last office visit: 9/25/2018 with prescribing provider:  ADRIAN Ibanez   Future Office Visit:     90 tablet 1     Sig: TAKE ONE TABLET BY MOUTH ONCE DAILY       Statins Protocol Failed - 10/24/2019  5:18 PM        Failed - LDL on file in past 12 months     Recent Labs   Lab Test 09/25/18  0932   *             Failed - Recent (12 mo) or future (30 days) visit within the authorizing provider's specialty     Patient has had an office visit with the authorizing provider or a provider within the authorizing providers department within the previous 12 mos or has a future within next 30 days. See \"Patient Info\" tab in inbasket, or \"Choose Columns\" in Meds & Orders section of the refill encounter.              Passed - No abnormal creatine kinase in past 12 months     No lab results found.             Passed - Medication is active on med list        Passed - Patient is age 18 or older          "

## 2019-10-28 DIAGNOSIS — I10 ESSENTIAL HYPERTENSION WITH GOAL BLOOD PRESSURE LESS THAN 140/90: ICD-10-CM

## 2019-10-28 RX ORDER — TRIAMTERENE/HYDROCHLOROTHIAZID 37.5-25 MG
1 TABLET ORAL DAILY
Qty: 30 TABLET | Refills: 0 | Status: CANCELLED | OUTPATIENT
Start: 2019-10-28

## 2019-10-28 RX ORDER — LOSARTAN POTASSIUM 50 MG/1
TABLET ORAL
Qty: 45 TABLET | Refills: 0 | Status: CANCELLED | OUTPATIENT
Start: 2019-10-28

## 2019-10-28 NOTE — TELEPHONE ENCOUNTER
Losartan 50mg  Last Written Prescription Date:  09-27-19  Last Fill Quantity: 45,   # refills: 0  Last Office Visit: 09-25-18  Future Office visit:       Triamterne-HCTZ 37.5-25mg        Last Written Prescription Date:  09/27/19  Last Fill Quantity: 30,   # refills: 0  Last Office Visit: 09-25-18  Future Office visit:        Patient is in between insurance plans right now and may not be able to be seen until next year when a new plan starts. Please contact him via [employer] or phone to see what his options are one getting these refilled until then.    Thank You,  Nickie Vick, Pharmacy Tech  [facility]

## 2019-10-30 NOTE — TELEPHONE ENCOUNTER
Routing refill request to provider for review/approval because: SEE NOTE FROM PHARMACY  Sunita given x2 and patient did not follow up, please advise.  Last written on 9/27/19.  Last OV September 2018.    Trixie Montes RN BSN

## 2019-10-31 RX ORDER — LOSARTAN POTASSIUM 50 MG/1
TABLET ORAL
Qty: 45 TABLET | Refills: 0 | Status: SHIPPED | OUTPATIENT
Start: 2019-10-31 | End: 2020-01-14

## 2019-10-31 RX ORDER — TRIAMTERENE/HYDROCHLOROTHIAZID 37.5-25 MG
TABLET ORAL
Qty: 30 TABLET | Refills: 0 | Status: SHIPPED | OUTPATIENT
Start: 2019-10-31 | End: 2020-01-14

## 2019-10-31 NOTE — TELEPHONE ENCOUNTER
Left message on voice mail for patient to call clinic. [phone]    Trixie Montes RN BSN

## 2019-10-31 NOTE — TELEPHONE ENCOUNTER
If he doesn't have insurance, [facility].

## 2019-11-01 NOTE — TELEPHONE ENCOUNTER
Left message on voice mail for patient to call clinic. [phone]    Trixie Montes RN BSN

## 2019-11-04 NOTE — TELEPHONE ENCOUNTER
Patient states that he will have new insurance coverage after January 1.     He is willing to come in for a BP check if needed prior to then ( on the ancillary schedule).     He does not have an active My Chart account, however he does not use it. He is willing to follow up by phone until the end of the year.     Trixie Montes RN BSN

## 2019-11-05 NOTE — TELEPHONE ENCOUNTER
Spoke with patient and informed him per Dea Ibanez.  Patient at [facility] now so unable to schedule but stated he would do so.

## 2019-11-27 NOTE — TELEPHONE ENCOUNTER
Patient also needs Triamterene and cozaar,  His insurance will be changing in the new year. Asked patient to make an appt.  He will call back to do this.  Thank you.  Caller informed calls received after 3 pm may not be returned until following day.

## 2019-11-29 RX ORDER — ATORVASTATIN CALCIUM 20 MG/1
20 TABLET, FILM COATED ORAL DAILY
Qty: 90 TABLET | Refills: 1 | Status: SHIPPED | OUTPATIENT
Start: 2019-11-29 | End: 2020-01-14

## 2019-11-29 RX ORDER — LOSARTAN POTASSIUM 50 MG/1
TABLET ORAL
Qty: 45 TABLET | Refills: 0 | Status: SHIPPED | OUTPATIENT
Start: 2019-11-29 | End: 2020-01-14

## 2019-11-29 RX ORDER — TRIAMTERENE/HYDROCHLOROTHIAZID 37.5-25 MG
1 TABLET ORAL DAILY
Qty: 30 TABLET | Refills: 0 | Status: SHIPPED | OUTPATIENT
Start: 2019-11-29 | End: 2020-01-14

## 2020-01-14 ENCOUNTER — OFFICE VISIT (OUTPATIENT)
Dept: FAMILY MEDICINE | Facility: CLINIC | Age: 50
End: 2020-01-14
Payer: COMMERCIAL

## 2020-01-14 VITALS
HEART RATE: 87 BPM | DIASTOLIC BLOOD PRESSURE: 98 MMHG | OXYGEN SATURATION: 95 % | WEIGHT: 244.2 LBS | SYSTOLIC BLOOD PRESSURE: 136 MMHG | BODY MASS INDEX: 34.96 KG/M2 | HEIGHT: 70 IN | RESPIRATION RATE: 18 BRPM | TEMPERATURE: 98.2 F

## 2020-01-14 DIAGNOSIS — R80.9 MICROALBUMINURIA: ICD-10-CM

## 2020-01-14 DIAGNOSIS — E11.9 TYPE 2 DIABETES MELLITUS WITHOUT COMPLICATION, WITHOUT LONG-TERM CURRENT USE OF INSULIN (H): ICD-10-CM

## 2020-01-14 DIAGNOSIS — I10 ESSENTIAL HYPERTENSION WITH GOAL BLOOD PRESSURE LESS THAN 140/90: ICD-10-CM

## 2020-01-14 DIAGNOSIS — E78.5 HYPERLIPIDEMIA LDL GOAL <100: ICD-10-CM

## 2020-01-14 DIAGNOSIS — Z00.01 ENCOUNTER FOR ROUTINE ADULT MEDICAL EXAM WITH ABNORMAL FINDINGS: Primary | ICD-10-CM

## 2020-01-14 LAB
ALBUMIN SERPL-MCNC: 3.9 G/DL (ref 3.4–5)
ALP SERPL-CCNC: 50 U/L (ref 40–150)
ALT SERPL W P-5'-P-CCNC: 65 U/L (ref 0–70)
ANION GAP SERPL CALCULATED.3IONS-SCNC: 4 MMOL/L (ref 3–14)
AST SERPL W P-5'-P-CCNC: 54 U/L (ref 0–45)
BILIRUB SERPL-MCNC: 1.5 MG/DL (ref 0.2–1.3)
BUN SERPL-MCNC: 16 MG/DL (ref 7–30)
CALCIUM SERPL-MCNC: 9.4 MG/DL (ref 8.5–10.1)
CHLORIDE SERPL-SCNC: 105 MMOL/L (ref 94–109)
CHOLEST SERPL-MCNC: 185 MG/DL
CO2 SERPL-SCNC: 28 MMOL/L (ref 20–32)
CREAT SERPL-MCNC: 0.79 MG/DL (ref 0.66–1.25)
CREAT UR-MCNC: 344 MG/DL
GFR SERPL CREATININE-BSD FRML MDRD: >90 ML/MIN/{1.73_M2}
GLUCOSE SERPL-MCNC: 188 MG/DL (ref 70–99)
HBA1C MFR BLD: 7 % (ref 0–5.6)
HDLC SERPL-MCNC: 45 MG/DL
HIV 1+2 AB+HIV1 P24 AG SERPL QL IA: NONREACTIVE
LDLC SERPL CALC-MCNC: 90 MG/DL
MICROALBUMIN UR-MCNC: 100 MG/L
MICROALBUMIN/CREAT UR: 29.07 MG/G CR (ref 0–17)
NONHDLC SERPL-MCNC: 140 MG/DL
POTASSIUM SERPL-SCNC: 3.8 MMOL/L (ref 3.4–5.3)
PROT SERPL-MCNC: 7.6 G/DL (ref 6.8–8.8)
PSA SERPL-ACNC: 0.21 UG/L (ref 0–4)
SODIUM SERPL-SCNC: 137 MMOL/L (ref 133–144)
TRIGL SERPL-MCNC: 250 MG/DL

## 2020-01-14 PROCEDURE — 80053 COMPREHEN METABOLIC PANEL: CPT | Performed by: PHYSICIAN ASSISTANT

## 2020-01-14 PROCEDURE — 99396 PREV VISIT EST AGE 40-64: CPT | Performed by: PHYSICIAN ASSISTANT

## 2020-01-14 PROCEDURE — 82043 UR ALBUMIN QUANTITATIVE: CPT | Performed by: PHYSICIAN ASSISTANT

## 2020-01-14 PROCEDURE — 99207 C FOOT EXAM  NO CHARGE: CPT | Mod: 25 | Performed by: PHYSICIAN ASSISTANT

## 2020-01-14 PROCEDURE — 83036 HEMOGLOBIN GLYCOSYLATED A1C: CPT | Performed by: PHYSICIAN ASSISTANT

## 2020-01-14 PROCEDURE — 36415 COLL VENOUS BLD VENIPUNCTURE: CPT | Performed by: PHYSICIAN ASSISTANT

## 2020-01-14 PROCEDURE — 99214 OFFICE O/P EST MOD 30 MIN: CPT | Mod: 25 | Performed by: PHYSICIAN ASSISTANT

## 2020-01-14 PROCEDURE — 87389 HIV-1 AG W/HIV-1&-2 AB AG IA: CPT | Performed by: PHYSICIAN ASSISTANT

## 2020-01-14 PROCEDURE — 80061 LIPID PANEL: CPT | Performed by: PHYSICIAN ASSISTANT

## 2020-01-14 PROCEDURE — G0103 PSA SCREENING: HCPCS | Performed by: PHYSICIAN ASSISTANT

## 2020-01-14 RX ORDER — ATORVASTATIN CALCIUM 20 MG/1
20 TABLET, FILM COATED ORAL DAILY
Qty: 90 TABLET | Refills: 3 | Status: SHIPPED | OUTPATIENT
Start: 2020-01-14 | End: 2021-03-04

## 2020-01-14 RX ORDER — TRIAMTERENE/HYDROCHLOROTHIAZID 37.5-25 MG
1 TABLET ORAL DAILY
Qty: 90 TABLET | Refills: 3 | Status: SHIPPED | OUTPATIENT
Start: 2020-01-14 | End: 2020-12-28

## 2020-01-14 RX ORDER — LOSARTAN POTASSIUM 100 MG/1
100 TABLET ORAL DAILY
Qty: 90 TABLET | Refills: 0 | Status: SHIPPED | OUTPATIENT
Start: 2020-01-14 | End: 2020-04-07

## 2020-01-14 ASSESSMENT — MIFFLIN-ST. JEOR: SCORE: 1973.93

## 2020-01-14 NOTE — PROGRESS NOTES
3  SUBJECTIVE:   CC: Robert Glynn is an 49 year old male who presents for preventive health visit.     Healthy Habits:    Do you get at least three servings of calcium containing foods daily (dairy, green leafy vegetables, etc.)? No    Amount of exercise or daily activities, outside of work: 7 day(s) per week    Problems taking medications regularly No    Medication side effects: No    Have you had an eye exam in the past two years? no    Do you see a dentist twice per year? yes    Do you have sleep apnea, excessive snoring or daytime drowsiness?yes- snoring       PROBLEMS TO ADD ON...  Patient informed that anything we discuss that is not related to preventative medicine, may be billed for; patient verbalizes understanding.    Fasting  Started Weight Watchers     Needing refills and/or labs for:  Hypertension  Is blood pressure being checked at home? No  Medication side effects? No    Hyperlipidemia  Any dietary restrictions? No If so, what type of restriction?   Medication side effects? No     Diabetes         -------------------------------------    Today's PHQ-2 Score:   PHQ-2 ( 1999 Pfizer) 1/14/2020 9/25/2018   Q1: Little interest or pleasure in doing things 0 0   Q2: Feeling down, depressed or hopeless 0 0   PHQ-2 Score 0 0       Abuse: Current or Past(Physical, Sexual or Emotional)- No  Do you feel safe in your environment? Yes        Social History     Tobacco Use     Smoking status: Never Smoker     Smokeless tobacco: Never Used   Substance Use Topics     Alcohol use: Yes     Comment: occ     If you drink alcohol do you typically have >3 drinks per day or >7 drinks per week? No                      Last PSA: No results found for: PSA    Reviewed orders with patient. Reviewed health maintenance and updated orders accordingly - Yes  Lab work is in process    Reviewed and updated as needed this visit by clinical staff  Tobacco  Allergies  Meds         Reviewed and updated as needed this visit by  "Provider        No past medical history on file.     ROS:  Other than what is noted in the HPI and PMH a complete review of systems is otherwise negative including: Constitutional, HEENT, endocrine, cardiovascular, respiratory, GI/, musculoskeletal, neuro, and psychiatric.     OBJECTIVE:   BP (!) 136/98   Pulse 87   Temp 98.2  F (36.8  C) (Tympanic)   Resp 18   Ht 1.778 m (5' 10\")   Wt 110.8 kg (244 lb 3.2 oz)   SpO2 95%   BMI 35.04 kg/m    EXAM:  GENERAL: healthy, alert and no distress  EYES: Eyes grossly normal to inspection, PERRL and conjunctivae and sclerae normal  HENT: ear canals and TM's normal, nose and mouth without ulcers or lesions  NECK: no adenopathy, no asymmetry, masses, or scars and thyroid normal to palpation  RESP: lungs clear to auscultation - no rales, rhonchi or wheezes  CV: regular rate and rhythm, normal S1 S2, no S3 or S4, no murmur, click or rub, no peripheral edema and peripheral pulses strong  ABDOMEN: soft, nontender, no hepatosplenomegaly, no masses and bowel sounds normal  MS: no gross musculoskeletal defects noted, no edema  SKIN: no suspicious lesions or rashes  NEURO: Normal strength and tone, mentation intact and speech normal  PSYCH: mentation appears normal, affect normal/bright  Diabetic foot exam: normal DP and PT pulses, no trophic changes or ulcerative lesions, normal sensory exam and normal monofilament exam    ASSESSMENT/PLAN:       ICD-10-CM    1. Encounter for routine adult medical exam with abnormal findings Z00.01 HIV Screening     GASTROENTEROLOGY ADULT REF PROCEDURE ONLY     Prostate spec antigen screen   2. Type 2 diabetes mellitus without complication, without long-term current use of insulin (H) E11.9 COMPREHENSIVE METABOLIC PANEL     Albumin Random Urine Quantitative with Creat Ratio     HEMOGLOBIN A1C     OPTOMETRY REFERRAL     C FOOT EXAM  NO CHARGE   3. Essential hypertension with goal blood pressure less than 140/90 I10 COMPREHENSIVE METABOLIC PANEL " "    losartan (COZAAR) 100 MG tablet     triamterene-HCTZ (MAXZIDE-25) 37.5-25 MG tablet   4. Hyperlipidemia LDL goal <100 E78.5 Lipid panel reflex to direct LDL Fasting     atorvastatin (LIPITOR) 20 MG tablet   5. Microalbuminuria R80.9 Albumin Random Urine Quantitative with Creat Ratio       1) Screenings discussed    2-5) Routine labs today. Meds renewed, losartan increased to 100mg. Recheck blood pressure on ancillary in 2 weeks. Follow up A1c through the lab in 6 months if <7% today.       COUNSELING:  Reviewed preventive health counseling, as reflected in patient instructions    Estimated body mass index is 35.04 kg/m  as calculated from the following:    Height as of this encounter: 1.778 m (5' 10\").    Weight as of this encounter: 110.8 kg (244 lb 3.2 oz).     reports that he has never smoked. He has never used smokeless tobacco.    Counseling Resources:  ATP IV Guidelines  Pooled Cohorts Equation Calculator  FRAX Risk Assessment  ICSI Preventive Guidelines  Dietary Guidelines for Americans, 2010  USDA's MyPlate  ASA Prophylaxis  Lung CA Screening    Dea Ibanez PA-C  [facility]  "

## 2020-01-22 PROBLEM — N18.1 CKD (CHRONIC KIDNEY DISEASE) STAGE 1, GFR 90 ML/MIN OR GREATER: Status: ACTIVE | Noted: 2020-01-22

## 2020-01-22 PROBLEM — E11.29 TYPE 2 DIABETES MELLITUS WITH MICROALBUMINURIA, WITHOUT LONG-TERM CURRENT USE OF INSULIN (H): Status: ACTIVE | Noted: 2017-06-02

## 2020-01-22 PROBLEM — R80.9 TYPE 2 DIABETES MELLITUS WITH MICROALBUMINURIA, WITHOUT LONG-TERM CURRENT USE OF INSULIN (H): Status: ACTIVE | Noted: 2017-06-02

## 2020-01-28 ENCOUNTER — ALLIED HEALTH/NURSE VISIT (OUTPATIENT)
Dept: NURSING | Facility: CLINIC | Age: 50
End: 2020-01-28
Payer: COMMERCIAL

## 2020-01-28 VITALS — HEART RATE: 87 BPM | DIASTOLIC BLOOD PRESSURE: 87 MMHG | SYSTOLIC BLOOD PRESSURE: 135 MMHG

## 2020-01-28 DIAGNOSIS — I10 ESSENTIAL HYPERTENSION WITH GOAL BLOOD PRESSURE LESS THAN 140/90: Primary | ICD-10-CM

## 2020-01-28 PROCEDURE — 99207 ZZC NO CHARGE NURSE ONLY: CPT

## 2020-01-28 NOTE — NURSING NOTE
Robert Glynn is a 49 year old patient who comes in today for a Blood Pressure check.  Initial BP:  /87   Pulse 87      87  Disposition: follow-up as previously indicated by provider

## 2020-03-10 ENCOUNTER — HEALTH MAINTENANCE LETTER (OUTPATIENT)
Age: 50
End: 2020-03-10

## 2020-04-07 DIAGNOSIS — I10 ESSENTIAL HYPERTENSION WITH GOAL BLOOD PRESSURE LESS THAN 140/90: ICD-10-CM

## 2020-04-07 RX ORDER — LOSARTAN POTASSIUM 100 MG/1
TABLET ORAL
Qty: 90 TABLET | Refills: 0 | Status: SHIPPED | OUTPATIENT
Start: 2020-04-07 | End: 2020-07-06

## 2020-04-07 NOTE — TELEPHONE ENCOUNTER
"Requested Prescriptions   Pending Prescriptions Disp Refills     losartan (COZAAR) 100 MG tablet [Pharmacy Med Name: LOSARTAN POTASSIUM 100MG TABS] 90 tablet 0     Sig: TAKE ONE TABLET BY MOUTH ONCE DAILY   Last Written Prescription Date:  1-14-20  Last Fill Quantity: 90,  # refills: 0   Last office visit: 1/14/2020 with prescribing provider:  1-14-20   Future Office Visit:      Angiotensin-II Receptors Passed - 4/7/2020  5:01 AM        Passed - Last blood pressure under 140/90 in past 12 months     BP Readings from Last 3 Encounters:   01/28/20 135/87   01/14/20 (!) 136/98   07/11/19 132/82                 Passed - Recent (12 mo) or future (30 days) visit within the authorizing provider's specialty     Patient has had an office visit with the authorizing provider or a provider within the authorizing providers department within the previous 12 mos or has a future within next 30 days. See \"Patient Info\" tab in inbasket, or \"Choose Columns\" in Meds & Orders section of the refill encounter.              Passed - Medication is active on med list        Passed - Patient is age 18 or older        Passed - Normal serum creatinine on file in past 12 months     Recent Labs   Lab Test 01/14/20  0757   CR 0.79       Ok to refill medication if creatinine is low          Passed - Normal serum potassium on file in past 12 months     Recent Labs   Lab Test 01/14/20  0757   POTASSIUM 3.8                       "

## 2020-10-01 DIAGNOSIS — I10 ESSENTIAL HYPERTENSION WITH GOAL BLOOD PRESSURE LESS THAN 140/90: ICD-10-CM

## 2020-10-02 RX ORDER — LOSARTAN POTASSIUM 100 MG/1
TABLET ORAL
Qty: 90 TABLET | Refills: 0 | Status: SHIPPED | OUTPATIENT
Start: 2020-10-02 | End: 2020-12-28

## 2020-11-09 ENCOUNTER — VIRTUAL VISIT (OUTPATIENT)
Dept: FAMILY MEDICINE | Facility: OTHER | Age: 50
End: 2020-11-09
Payer: COMMERCIAL

## 2020-11-09 PROCEDURE — 99421 OL DIG E/M SVC 5-10 MIN: CPT | Performed by: PHYSICIAN ASSISTANT

## 2020-11-09 NOTE — PROGRESS NOTES
"Date: 2020 06:41:58  Clinician: Eloisa Gordon  Clinician NPI: [id number]  Patient: Robert Glynn  Patient : 1970  Patient Address: [address] [place] [address] [place] [address]  Patient Phone: [phone]  Visit Protocol: URI  Patient Summary:  Robert is a 50 year old ( : 1970 ) male who initiated a [facility] Visit for COVID-19 (Coronavirus) evaluation and screening. When asked the question \"Please sign me up to receive news, health information and promotions from [facility].\", Robert responded \"No\".    Robert states his symptoms started today.   His symptoms consist of malaise, a headache, a cough, and nasal congestion. He is experiencing difficulty breathing due to nasal congestion but he is not short of breath. Robert also feels feverish.   Symptom details     Nasal secretions: The color of his mucus is white.    Cough: Robert coughs a few times an hour and his cough is not more bothersome at night. Phlegm comes into his throat when he coughs. He does not believe his cough is caused by post-nasal drip. The color of the phlegm is white.     Temperature: His current temperature is 106 degrees Fahrenheit. Robert has had a temperature over 100 degrees Fahrenheit for 1-2 days.     Headache: He states the headache is mild (1-3 on a 10 point pain scale).      Robert denies having vomiting, rhinitis, facial pain or pressure, myalgias, chills, sore throat, teeth pain, ageusia, diarrhea, ear pain, wheezing, nausea, and anosmia. He also denies taking antibiotic medication in the past month, having recent facial or sinus surgery in the past 60 days, and having a sinus infection within the past year.   Precipitating events  He has not recently been exposed to someone with influenza. Robert has been in close contact with the following high risk individuals: adults 65 or older.   Pertinent COVID-19 (Coronavirus) information  Robert does not work or volunteer as healthcare worker or a . In the past 14 days, Robert has " not worked or volunteered at a healthcare facility or group living setting.   In the past 14 days, he also has not lived in a congregate living setting.   Robert has not had a close contact with a laboratory-confirmed COVID-19 patient within 14 days of symptom onset.    Since December 2019, Robert has not been tested for COVID-19 and has not had upper respiratory infection or influenza-like illness.   Triage Point(s) temporarily suspended for COVID-19 (Coronavirus) screening  Robert reported the following symptoms which were previously protocol referral points. These protocol referral points have temporarily been removed for purposes of COVID-19 (Coronavirus) screening.   Temperature &gt; 102. Current temperature: 106    Pertinent medical history  Robert needs a return to work/school note.   Weight: 250 lbs   Robert does not smoke or use smokeless tobacco.   Additional information as reported by the patient (free text): There have been several confirmed cases of Covid at Robert's work; he has not been around any of those people though.   Weight: 250 lbs    MEDICATIONS: Cozaar oral, triamterene-hydrochlorothiazide oral, atorvastatin oral, ALLERGIES: NKDA  Clinician Response:  Dear Robert,  Your health is our priority. Based on the information you have provided, it is possible that you may have some type of viral infection.  Please read the full treatment plan and see my recommendations below.  Medication information  Because you have a viral infection, antibiotics will not help you get better. Treating a viral infection with antibiotics could actually make you feel worse.  Self care  Steps you can take to be as comfortable as possible:     Rest.    Drink plenty of fluids.    Take a warm shower to loosen congestion    Use a cool-mist humidifier.    Take a spoonful of honey to reduce your cough.     Additional treatment plan   Your symptoms show that you may have coronavirus (COVID-19). This illness can cause fever, cough and  trouble breathing. Many people get a mild case and get better on their own. Some people can get very sick.  Based on the symptoms you have shared, I would like you to be re-checked in 2 to 3 days. Please call your family clinic to set up a video or phone visit.  Will I be tested for COVID-19?  We would like to test you for this virus.   Please call [phone] to schedule your visit. Explain that you were referred by [facility] to have a COVID-19 test. Be ready to share your [facility] visit ID number.   * If you need to schedule in [place] or [facility] please call [phone] or for [employer] employees please call [phone].    The following will serve as your written order for this COVID Test, ordered by me, for the indication of suspected COVID [Z20.828]: The test will be ordered in [facility], our electronic health record, after you are scheduled. It will show as ordered and authorized by Anibal Estevez MD.  Order: COVID-19 (Coronavirus) PCR for SYMPTOMATIC testing from [facility].   1.When it's time for your COVID test:   Stay at least 6 feet away from others. (If someone will drive you to your test, stay in the backseat, as far away from the  as you can.)   Cover your mouth and nose with a mask, tissue or washcloth.  Go straight to the testing site. Don't make any stops on the way there or back.      2.Starting now: Stay home and away from others (self-isolate) until:   You've had no fever---and no medicine that reduces fever---for one full day (24 hours). And...   Your other symptoms have gotten better. For example, your cough or breathing has improved. And...   At least 10 days have passed since your symptoms started.       During this time, don't leave the house except for testing or medical care.   Stay in your own room, even for meals. Use your own bathroom if you can.   Stay away from others in your home. No hugging, kissing or shaking hands. No visitors.  Don't go to work, school or anywhere else.    Clean  "\"high touch\" surfaces often (doorknobs, counters, handles, etc.). Use a household cleaning spray or wipes. You'll find a full list of  on the EPA website: www.epa.gov/pesticide-registration/list-n-disinfectants-use-against-sars-cov-2.   Cover your mouth and nose with a mask, tissue or washcloth to avoid spreading germs.  Wash your hands and face often. Use soap and water.  Caregivers in these groups are at risk for severe illness due to COVID-19:  o People 65 years and older  o People who live in a nursing home or long-term care facility  o People with chronic disease (lung, heart, cancer, diabetes, kidney, liver, immunologic)   o People who have a weakened immune system, including those who:   Are in cancer treatment  Take medicine that weakens the immune system, such as corticosteroids  Had a bone marrow or organ transplant  Have an immune deficiency  Have poorly controlled HIV or AIDS  Are obese (body mass index of 40 or higher)  Smoke regularly   o Caregivers should wear gloves while washing dishes, handling laundry and cleaning bedrooms and bathrooms.  o Use caution when washing and drying laundry: Don't shake dirty laundry, and use the warmest water setting that you can.  o For more tips, go to www.cdc.gov/coronavirus/2019-ncov/downloads/10Things.pdf.      How can I take care of myself?   Get lots of rest. Drink extra fluids (unless a doctor has told you not to)   Take Tylenol (acetaminophen) for fever or pain. If you have liver or kidney problems, ask your family doctor if it's okay to take Tylenol.   Adults can take either:    650 mg (two 325 mg pills) every 4 to 6 hours, or...   1,000 mg (two 500 mg pills) every 8 hours as needed.    Note: Don't take more than 3,000 mg in one day. Acetaminophen is found in many medicines (both prescribed and over-the-counter medicines). Read all labels to be sure you don't take too much.   For children, check the Tylenol bottle for the right dose. The dose is based " on the child's age or weight.    If you have other health problems (like cancer, heart failure, an organ transplant or severe kidney disease): Call your specialty clinic if you don't feel better in the next 2 days.       Know when to call 911. Emergency warning signs include:    Trouble breathing or shortness of breath Pain or pressure in the chest that doesn't go away Feeling confused like you haven't felt before, or not being able to wake up Bluish-colored lips or face  Where can I get more information?   [facility] -- About COVID-19: [email or web address]   CDC -- What to Do If You're Sick: www.cdc.gov/coronavirus/2019-ncov/about/steps-when-sick.html   [facility] -- Ending Home Isolation: www.cdc.gov/coronavirus/2019-ncov/hcp/disposition-in-home-patients.html   [facility] -- Caring for Someone: www.cdc.gov/coronavirus/2019-ncov/if-you-are-sick/care-for-someone.html   [facility] -- Interim Guidance for Hospital Discharge to Home: www.[facility].[place]/diseases/coronavirus/hcp/hospdischarge.pdf   [facility] clinical trials (COVID-19 research studies): [email or web address]-[email or web address]    Below are the COVID-19 hotlines at the [place] Department of Health ([facility]). Interpreters are available.    For health questions: Call 934-149-6096 or 1-455.742.1992 (7 a.m. to 7 p.m.) For questions about schools and childcare: Call 892-550-3537 or 1-933.742.5001 (7 a.m. to 7 p.m.)       COVID-19 (Coronavirus) General Information  Because there is currently no vaccine to prevent infection, the best way to protect yourself is to avoid being exposed to this virus. Common symptoms of COVID-19 include but are not limited to fever, cough, and shortness of breath. These symptoms appear 2-14 days after you are exposed to the virus that causes COVID-19. Click here for more information from the CDC on how to protect yourself.  If you are sick with COVID-19 or suspect you are infected with the virus that causes COVID-19,  follow the steps here from the CDC to help prevent the disease from spreading to people in your home and community.  Click here for general information from the CDC on testing.  If you develop any of these emergency warning signs for COVID-19, get medical attention immediately:     Trouble breathing    Persistent pain or pressure in the chest    New confusion or inability to arouse    Bluish lips or face      Call your doctor or clinic before going in. Call 911 if you have a medical emergency and notify the  you have or think you may have COVID-19.  For more detailed and up to date information on COVID-19 (Coronavirus), please visit the CDC website.   Diagnosis: Fever, unspecified  Diagnosis ICD: R50.9

## 2020-12-27 ENCOUNTER — HEALTH MAINTENANCE LETTER (OUTPATIENT)
Age: 50
End: 2020-12-27

## 2020-12-27 DIAGNOSIS — I10 ESSENTIAL HYPERTENSION WITH GOAL BLOOD PRESSURE LESS THAN 140/90: ICD-10-CM

## 2020-12-28 RX ORDER — TRIAMTERENE/HYDROCHLOROTHIAZID 37.5-25 MG
TABLET ORAL
Qty: 30 TABLET | Refills: 0 | Status: SHIPPED | OUTPATIENT
Start: 2020-12-28 | End: 2021-01-24

## 2020-12-28 RX ORDER — LOSARTAN POTASSIUM 100 MG/1
TABLET ORAL
Qty: 30 TABLET | Refills: 0 | Status: SHIPPED | OUTPATIENT
Start: 2020-12-28 | End: 2021-02-01

## 2020-12-28 NOTE — TELEPHONE ENCOUNTER
Prescription approved per [facility] Refill Protocol.    Trixie Montes RN BSN  [facility]

## 2021-01-22 DIAGNOSIS — I10 ESSENTIAL HYPERTENSION WITH GOAL BLOOD PRESSURE LESS THAN 140/90: ICD-10-CM

## 2021-01-25 RX ORDER — TRIAMTERENE/HYDROCHLOROTHIAZID 37.5-25 MG
TABLET ORAL
Qty: 30 TABLET | Refills: 0 | Status: SHIPPED | OUTPATIENT
Start: 2021-01-25 | End: 2021-02-22

## 2021-01-25 NOTE — TELEPHONE ENCOUNTER
Routing refill request to provider for review/approval because:  Labs not current:  K+, cr, NA  Patient needs to be seen because it has been more than 1 year since last office visit.  1/14/20    Medication pended for approval, 30 day supply with reminder.

## 2021-01-28 DIAGNOSIS — I10 ESSENTIAL HYPERTENSION WITH GOAL BLOOD PRESSURE LESS THAN 140/90: ICD-10-CM

## 2021-02-01 RX ORDER — LOSARTAN POTASSIUM 100 MG/1
TABLET ORAL
Qty: 30 TABLET | Refills: 0 | Status: SHIPPED | OUTPATIENT
Start: 2021-02-01 | End: 2021-03-02

## 2021-02-01 NOTE — CONFIDENTIAL NOTE
Routing refill request to provider for review/approval because:  Labs not current  Patient needs to be seen because it has been more than 1 year since last office visit.  1/14/20    Medication pended for approval, 30 day supply with reminder.   2nd reminder  [employer] message also sent to pt to schedule

## 2021-02-20 DIAGNOSIS — I10 ESSENTIAL HYPERTENSION WITH GOAL BLOOD PRESSURE LESS THAN 140/90: ICD-10-CM

## 2021-02-22 RX ORDER — TRIAMTERENE/HYDROCHLOROTHIAZID 37.5-25 MG
TABLET ORAL
Qty: 30 TABLET | Refills: 0 | Status: SHIPPED | OUTPATIENT
Start: 2021-02-22 | End: 2021-03-05

## 2021-02-22 NOTE — TELEPHONE ENCOUNTER
Routing refill request to provider for review/approval because:  Sunita given x1 and patient did not follow up, please advise  Called patient and left a message to set up an appointment for his refills.     Renuka Stevens RN

## 2021-02-25 NOTE — PROGRESS NOTES
"    Assessment & Plan     Advance care planning      Essential hypertension with goal blood pressure less than 140/90    - COMPREHENSIVE METABOLIC PANEL  - losartan (COZAAR) 100 MG tablet; TAKE ONE TABLET BY MOUTH ONCE DAILY  - triamterene-HCTZ (MAXZIDE-25) 37.5-25 MG tablet; TAKE ONE TABLET BY MOUTH ONCE DAILY    CKD (chronic kidney disease) stage 1, GFR 90 ml/min or greater    - Albumin Random Urine Quantitative with Creat Ratio    Hyperlipidemia LDL goal <100    - Lipid panel reflex to direct LDL Fasting    Type 2 diabetes mellitus with microalbuminuria, without long-term current use of insulin (H)    - HEMOGLOBIN A1C  - COMPREHENSIVE METABOLIC PANEL  - Albumin Random Urine Quantitative with Creat Ratio  - OPTOMETRY REFERRAL; Future  - FOOT EXAM    Screening for prostate cancer    - PSA, screen    Screening for thyroid disorder    - TSH with free T4 reflex    Special screening for malignant neoplasms, colon    - GASTROENTEROLOGY ADULT REF PROCEDURE ONLY; Future    Encounter for hepatitis C screening test for low risk patient    - Hepatitis C Screen Reflex to HCV RNA Quant and Genotype    20 minutes spent on the date of the encounter doing chart review, history and exam, documentation and further activities as noted above       BMI:   Estimated body mass index is 33.14 kg/m  as calculated from the following:    Height as of this encounter: 1.785 m (5' 10.28\").    Weight as of this encounter: 105.6 kg (232 lb 12.8 oz).   Weight management plan: Discussed healthy diet and exercise guidelines discussed cutting out bread, pasta and sugar; alcohol. Start slowly increasing daily exercise.  His medical conditions can be improved greatly by this and weight loss Pt seems on board with improving his health.     See Patient Instructions: discussed follow up as needed.     Return in about 1 year (around 3/5/2022), or if symptoms worsen or fail to improve.    Alea Villanueva, MERRITT  [facility]    Subjective "   Robert is a 50 year old who presents for the following health issues     HPI       Diabetes Follow-up      How often are you checking your blood sugar? Not at all    What concerns do you have today about your diabetes? None     Do you have any of these symptoms? (Select all that apply)  No numbness or tingling in feet.  No redness, sores or blisters on feet.  No complaints of excessive thirst.  No reports of blurry vision.  No significant changes to weight.    Have you had a diabetic eye exam in the last 12 months? No      Hyperlipidemia Follow-Up      Are you regularly taking any medication or supplement to lower your cholesterol?   Yes- lipitor    Are you having muscle aches or other side effects that you think could be caused by your cholesterol lowering medication?  No    Hypertension Follow-up      Do you check your blood pressure regularly outside of the clinic? No     Are you following a low salt diet? No    Are your blood pressures ever more than 140 on the top number (systolic) OR more   than 90 on the bottom number (diastolic), for example 140/90? Na  Denies CP, SOB, headaches, dizziness    BP Readings from Last 2 Encounters:   03/05/21 (!) 150/95   01/28/20 135/87     Hemoglobin A1C (%)   Date Value   01/14/2020 7.0 (H)   04/15/2019 6.1 (H)     LDL Cholesterol Calculated (mg/dL)   Date Value   01/14/2020 90   09/25/2018 104 (H)         How many servings of fruits and vegetables do you eat daily?  2-3    On average, how many sweetened beverages do you drink each day (Examples: soda, juice, sweet tea, etc.  Do NOT count diet or artificially sweetened beverages)?   0    How many days per week do you exercise enough to make your heart beat faster? 3 or less    How many minutes a day do you exercise enough to make your heart beat faster? 9 or less    How many days per week do you miss taking your medication? 0      Review of Systems   Constitutional, HEENT, cardiovascular, pulmonary, GI, , musculoskeletal,  "neuro, skin, endocrine and psych systems are negative, except as otherwise noted.      Objective    BP (!) 150/95   Pulse 98   Temp 97.5  F (36.4  C) (Tympanic)   Resp 16   Ht 1.785 m (5' 10.28\")   Wt 105.6 kg (232 lb 12.8 oz)   SpO2 96%   BMI 33.14 kg/m    Body mass index is 33.14 kg/m .  Physical Exam   GENERAL: healthy, alert and no distress  EYES: Eyes grossly normal to inspection, PERRL and conjunctivae and sclerae normal  NECK: thyroid normal to palpation  RESP: lungs clear to auscultation - no rales, rhonchi or wheezes  CV: regular rate and rhythm, normal S1 S2, no S3 or S4, no murmur, click or rub, no peripheral edema and peripheral pulses strong  MS: no gross musculoskeletal defects noted, no edema, no CVA tenderness  PSYCH: mentation appears normal, affect normal/bright  Diabetic foot exam: normal DP and PT pulses, no trophic changes or ulcerative lesions and normal sensory exam    See orders            "

## 2021-03-03 DIAGNOSIS — E78.5 HYPERLIPIDEMIA LDL GOAL <100: ICD-10-CM

## 2021-03-04 RX ORDER — ATORVASTATIN CALCIUM 20 MG/1
TABLET, FILM COATED ORAL
Qty: 90 TABLET | Refills: 3 | Status: SHIPPED | OUTPATIENT
Start: 2021-03-04 | End: 2022-02-17

## 2021-03-04 NOTE — TELEPHONE ENCOUNTER
Patient scheduled to follow up with Alea Villanueva tomorrow.  Will route RX to her to address at visit      Lab Results   Component Value Date    CHOL 185 01/14/2020     Lab Results   Component Value Date    HDL 45 01/14/2020     Lab Results   Component Value Date    LDL 90 01/14/2020     Lab Results   Component Value Date    TRIG 250 01/14/2020     No results found for: CHOLHDLRATIO

## 2021-03-05 ENCOUNTER — OFFICE VISIT (OUTPATIENT)
Dept: FAMILY MEDICINE | Facility: CLINIC | Age: 51
End: 2021-03-05
Payer: COMMERCIAL

## 2021-03-05 VITALS
BODY MASS INDEX: 33.33 KG/M2 | DIASTOLIC BLOOD PRESSURE: 95 MMHG | HEART RATE: 98 BPM | SYSTOLIC BLOOD PRESSURE: 150 MMHG | TEMPERATURE: 97.5 F | HEIGHT: 70 IN | RESPIRATION RATE: 16 BRPM | OXYGEN SATURATION: 96 % | WEIGHT: 232.8 LBS

## 2021-03-05 DIAGNOSIS — R80.9 TYPE 2 DIABETES MELLITUS WITH MICROALBUMINURIA, WITHOUT LONG-TERM CURRENT USE OF INSULIN (H): Primary | ICD-10-CM

## 2021-03-05 DIAGNOSIS — Z13.29 SCREENING FOR THYROID DISORDER: ICD-10-CM

## 2021-03-05 DIAGNOSIS — Z11.59 ENCOUNTER FOR HEPATITIS C SCREENING TEST FOR LOW RISK PATIENT: ICD-10-CM

## 2021-03-05 DIAGNOSIS — I10 ESSENTIAL HYPERTENSION WITH GOAL BLOOD PRESSURE LESS THAN 140/90: ICD-10-CM

## 2021-03-05 DIAGNOSIS — E11.29 TYPE 2 DIABETES MELLITUS WITH MICROALBUMINURIA, WITHOUT LONG-TERM CURRENT USE OF INSULIN (H): ICD-10-CM

## 2021-03-05 DIAGNOSIS — Z12.11 SPECIAL SCREENING FOR MALIGNANT NEOPLASMS, COLON: ICD-10-CM

## 2021-03-05 DIAGNOSIS — Z12.5 SCREENING FOR PROSTATE CANCER: ICD-10-CM

## 2021-03-05 DIAGNOSIS — N18.1 CKD (CHRONIC KIDNEY DISEASE) STAGE 1, GFR 90 ML/MIN OR GREATER: ICD-10-CM

## 2021-03-05 DIAGNOSIS — R80.9 TYPE 2 DIABETES MELLITUS WITH MICROALBUMINURIA, WITHOUT LONG-TERM CURRENT USE OF INSULIN (H): ICD-10-CM

## 2021-03-05 DIAGNOSIS — E78.5 HYPERLIPIDEMIA LDL GOAL <100: ICD-10-CM

## 2021-03-05 DIAGNOSIS — Z71.89 ADVANCE CARE PLANNING: Primary | ICD-10-CM

## 2021-03-05 DIAGNOSIS — E11.29 TYPE 2 DIABETES MELLITUS WITH MICROALBUMINURIA, WITHOUT LONG-TERM CURRENT USE OF INSULIN (H): Primary | ICD-10-CM

## 2021-03-05 LAB
ALBUMIN SERPL-MCNC: 4 G/DL (ref 3.4–5)
ALP SERPL-CCNC: 73 U/L (ref 40–150)
ALT SERPL W P-5'-P-CCNC: 81 U/L (ref 0–70)
ANION GAP SERPL CALCULATED.3IONS-SCNC: 8 MMOL/L (ref 3–14)
AST SERPL W P-5'-P-CCNC: 53 U/L (ref 0–45)
BILIRUB SERPL-MCNC: 1.2 MG/DL (ref 0.2–1.3)
BUN SERPL-MCNC: 18 MG/DL (ref 7–30)
CALCIUM SERPL-MCNC: 9.4 MG/DL (ref 8.5–10.1)
CHLORIDE SERPL-SCNC: 101 MMOL/L (ref 94–109)
CHOLEST SERPL-MCNC: 213 MG/DL
CO2 SERPL-SCNC: 25 MMOL/L (ref 20–32)
CREAT SERPL-MCNC: 0.69 MG/DL (ref 0.66–1.25)
CREAT UR-MCNC: 268 MG/DL
GFR SERPL CREATININE-BSD FRML MDRD: >90 ML/MIN/{1.73_M2}
GLUCOSE SERPL-MCNC: 349 MG/DL (ref 70–99)
HBA1C MFR BLD: 11 % (ref 0–5.6)
HDLC SERPL-MCNC: 40 MG/DL
LDLC SERPL CALC-MCNC: ABNORMAL MG/DL
LDLC SERPL DIRECT ASSAY-MCNC: 110 MG/DL
MICROALBUMIN UR-MCNC: 488 MG/L
MICROALBUMIN/CREAT UR: 182.09 MG/G CR (ref 0–17)
NONHDLC SERPL-MCNC: 173 MG/DL
POTASSIUM SERPL-SCNC: 3.6 MMOL/L (ref 3.4–5.3)
PROT SERPL-MCNC: 8.1 G/DL (ref 6.8–8.8)
PSA SERPL-ACNC: 0.22 UG/L (ref 0–4)
SODIUM SERPL-SCNC: 134 MMOL/L (ref 133–144)
TRIGL SERPL-MCNC: 497 MG/DL
TSH SERPL DL<=0.005 MIU/L-ACNC: 1.46 MU/L (ref 0.4–4)

## 2021-03-05 PROCEDURE — 83721 ASSAY OF BLOOD LIPOPROTEIN: CPT | Mod: 59 | Performed by: NURSE PRACTITIONER

## 2021-03-05 PROCEDURE — 80061 LIPID PANEL: CPT | Performed by: NURSE PRACTITIONER

## 2021-03-05 PROCEDURE — 36415 COLL VENOUS BLD VENIPUNCTURE: CPT | Performed by: NURSE PRACTITIONER

## 2021-03-05 PROCEDURE — 82043 UR ALBUMIN QUANTITATIVE: CPT | Performed by: NURSE PRACTITIONER

## 2021-03-05 PROCEDURE — 86803 HEPATITIS C AB TEST: CPT | Performed by: NURSE PRACTITIONER

## 2021-03-05 PROCEDURE — 84443 ASSAY THYROID STIM HORMONE: CPT | Performed by: NURSE PRACTITIONER

## 2021-03-05 PROCEDURE — 99214 OFFICE O/P EST MOD 30 MIN: CPT | Performed by: NURSE PRACTITIONER

## 2021-03-05 PROCEDURE — G0103 PSA SCREENING: HCPCS | Performed by: NURSE PRACTITIONER

## 2021-03-05 PROCEDURE — 99207 PR FOOT EXAM NO CHARGE: CPT | Performed by: NURSE PRACTITIONER

## 2021-03-05 PROCEDURE — 83036 HEMOGLOBIN GLYCOSYLATED A1C: CPT | Performed by: NURSE PRACTITIONER

## 2021-03-05 PROCEDURE — 80053 COMPREHEN METABOLIC PANEL: CPT | Performed by: NURSE PRACTITIONER

## 2021-03-05 RX ORDER — LOSARTAN POTASSIUM 100 MG/1
TABLET ORAL
Qty: 90 TABLET | Refills: 3 | Status: SHIPPED | OUTPATIENT
Start: 2021-03-05 | End: 2022-04-06

## 2021-03-05 RX ORDER — TRIAMTERENE/HYDROCHLOROTHIAZID 37.5-25 MG
TABLET ORAL
Qty: 90 TABLET | Refills: 3 | Status: SHIPPED | OUTPATIENT
Start: 2021-03-05 | End: 2022-04-06

## 2021-03-05 ASSESSMENT — MIFFLIN-ST. JEOR: SCORE: 1921.6

## 2021-03-05 NOTE — PATIENT INSTRUCTIONS
Patient Education     Weight Management: Take It Off and Keep It Off    It s easy to be motivated when you first start. The key is to stay motivated all along the way and to have realistic and achievable goals. There are things you can do to keep yourself on the path to success.   Don t focus on daily weight gains and losses. Instead, weigh yourself no more than once a week at the same time of day. Weighing yourself each day will probably only frustrate you. Find a friend who also needs to lose some weight and you can encourage each other.   Stay motivated  Here are suggestions to keep you motivated:     Remind yourself of your goals. Post them near the refrigerator or desk where you work.    Make daily entries in your diary or journal about your activity and eating. A visual reminder of success, like a gold star, can help keep you going.    Every week, take time to look back on how much you ve accomplished, and the changes you may have made.    Try taking a nutrition class. It can help you learn new shopping, cooking, and eating skills, and also meet new people. You might try a low-fat cooking or yoga class.    Don t be hard on yourself or give up if you slip. Be patient. Learn from your mistakes and adjust your plan if you need to. Then get right back to it.    Be realistic about your goals. Talk with a dietitian or your healthcare provider about what goals are reasonable for you.   Believe that you can do it  How you think about yourself is just as important as what you do. If you don t think you can succeed, chances are you won t. Believe that you can stick to your plan and meet your goals:     If you don t meet a goal, don t use it as an excuse to give up on your whole plan. Adjust your goal and try again.    Try to understand your own attitude about food.  Are you subject to emotional eating?    Learn how to accept compliments. Even if you get embarrassed, just say  thank you.     Make a list of the things  that others like about you and that you like about yourself. Add something new from time to time. Keep this list to look at when you need a lift.  Resources    The [employer] s [employer] on Fitness, Sports & Nutritionwww.fitness.gov    Academy of Nutrition and Dieteticswww.eatright.org    Healthfinderwww.healthfinder.gov    [employer] last reviewed this educational content on 4/1/2018 2000-2020 [employer]. All rights reserved. This information is not intended as a substitute for professional medical care. Always follow your healthcare professional's instructions.           Patient Education     Weight Management: Overcoming Your Barriers    You may have many reasons why you re not ready to lose weight. You may not feel you have the time or the skills. You may be afraid of losing weight and gaining it back again. Well, you can lose weight. And you can keep the weight off, if you make changes slowly and stick with them. Remember that you may never find the perfect time to lose weight. Decide that the right time to be healthier is now.   Common barriers  Barrier 1: I don t want to deny myself.  Barrier Buster: You don t have to! Moderation is the key:    Watch portion sizes and know when you're eating more than one serving.    Plan to ask for a doggy bag when you eat out.    Have just one.    Read the labels on foods to know what foods may be hiding calories or salt.    Choose lower-fat and lower-calorie versions of your favorites.    Use a small plate instead of a normal-sized plate.   Barrier 2: I lost weight before but I gained it right back.  Barrier Buster: Make this time different:    List what worked and didn t work last time and what you can try this time.    Choose changes that you are willing to stick with.    Work exercise into your weight-loss plan.    Be realistic about what is possible. Your plan has to fit into your life in a balanced way that works for you.   Barrier 3: I don t have the  time to be active.  Barrier Buster: It takes just a few minutes a day!    Be active with a pet or the kids.    Block off activity time in your schedule.    Borrow some time that you usually spend watching TV.    You are too important not to take time to exercise--it is your life!   Feel good about yourself  Do you eat more because you feel bad about yourself, then feel even worse as you gain weight? This is a  vicious cycle.  Breaking this cycle is not easy. You may need group support or counseling. Always remember that you are a valuable person, no matter what size or shape you are.   Do you have a health problem? If so, don t use it as an excuse for not losing weight. Ask your healthcare provider or dietitian about methods to lose weight that are safe for you. For example, even if you have severe arthritis, it may be easier for you to exercise in a pool. Get advice from a .    [employer] last reviewed this educational content on 4/1/2018 2000-2020 [employer]. All rights reserved. This information is not intended as a substitute for professional medical care. Always follow your healthcare professional's instructions.           Patient Education     Weight Management: Healthy Eating  Food is your body s fuel. You can t live without it. The key is to give your body enough nutrients and energy without eating too much. Reading food labels can help you make healthy choices. Also, learn new eating habits to manage your weight. Nutrition labels are being redesigned by the FDA to emphasize the number of calories being consumed as well as the amount of more nutrients, such as added sugars, vitamin D, and potassium.      All the values on the label are based on one serving. The serving size is the average portion. Remember to multiply the values on the label by the number of servings you eat.   Eat less fat  A gram of fat has almost 2.5 times the calories of a gram of protein or  carbohydrates. Try to balance your food choices so that only 20% to 35% of your calories comes from total fat. This means an average of 2  to 3  grams of fat for each 100 calories you eat.   Eat more fiber  High-fiber foods are digested more slowly than low-fiber foods, so you feel full longer. Try to get at least 25 grams of fiber each day for a 2000 calorie diet. Foods high in fiber include:     Vegetables and fruits    Whole-grain or bran breads, pastas, and cereals    Legumes (beans) and peas  As you start to eat more fiber, be sure to drink plenty of water. It will help keep your digestive system working smoothly.   Tips  Do's and don'ts include:     Don t skip meals. This often leads to overeating later on. It s best to spread your eating throughout the day.    Eat a variety of foods, not just a few favorites.    If you find yourself eating when you re not hungry, ask yourself why. Many of us eat when we re bored, stressed, or just to be polite. Listen to your body. If you re not hungry, get busy doing something else instead of eating.    Eat slower, shooting for 20 to 30 minutes for each meal. It takes 20 minutes for your stomach to tell your brain that it s full. Slow eaters tend to eat less and are still satisfied, while fast eaters may tend to be overeaters.     Pay attention to what you eat. Don t read or watch TV during your meal.  [employer] last reviewed this educational content on 4/1/2018 2000-2020 [employer]. All rights reserved. This information is not intended as a substitute for professional medical care. Always follow your healthcare professional's instructions.           Patient Education     Weight Management: Fact and Fiction    Knowing the truth about losing weight can help you separate what works from what doesn t. Don t be taken in by expensive weight-loss fads like pills, herbs, and special foods that promise unbelievable results. There s no magic way to lose weight. If  you have questions about weight loss, ask your healthcare provider.  Fiction:  The faster I lose weight, the better.   Fact: Rapid weight loss is usually due to loss of water or muscle mass. What you re trying to get rid of is extra fat. Aim to lose a 1/2 pound to 2 pounds (0.25 kg to 0.9 kg).a week. Then you re more likely to lose fat rather than water or muscle.  Fiction:  Skipping meals will help me lose weight.   Fact: When you skip meals, you don t give your body the energy it needs to work. Hunger makes you more likely to overeat later on. It s best to spread your meals throughout the day. Eat at least three meals a day.  Fiction:  I can t start exercising until I lose weight.   Fact: The sooner you start exercising the better. Exercise helps burn more calories, tone your muscles, and keep your appetite in check. People who continue to exercise after they lose weight are more likely to keep the weight off.  Fiction:  The fewer calories I eat, the better.   Fact: This seems like it should be true, but it s not. When you eat too few calories, your body acts as if it s on a desert island. It thinks food is scarce, so it slows down your metabolism (how fast you burn calories) to save energy. By eating too few calories, you make it harder to lose weight.  Fiction:  Once I lose weight, I can go back to living the way I did before.   Fact: Going back to your old eating habits and giving up exercise is a sure way to regain any weight you ve lost. The lifestyle changes that help you lose extra weight can also help keep it off. This is why you need to make realistic changes you can stick with.  Fiction:  Low-fat and fat-free mean low-calorie.   Fact: All foods, even fat-free ones, have calories. Eat too many calories and you ll gain weight. It s OK to treat yourself to a fat-free cookie or two. Just don t eat the whole box! A dietitian will help you figure this out, and will likely recommend that you eat three meals a  day, with protein with each meal. Learn to read nutrition labels to see what you are really eating.  [employer] last reviewed this educational content on 4/1/2018 2000-2020 [employer]. All rights reserved. This information is not intended as a substitute for professional medical care. Always follow your healthcare professional's instructions.           Patient Education     Weight Management: Exercise and Activity    Studies show that people who exercise are the most likely to lose weight and keep it off. Exercise burns calories. It helps build muscle to make your body stronger. Make exercise an important part of your weight-management plan.   Make activity part of your day  You may not think you have the time to exercise. But you can work activity into your daily life--you just need to be committed. Take 10 minutes out of your lunch hour to take a walk. Walk to the [employer] to get your paper instead of having it delivered. Make it a habit to take the stairs instead of the elevator. Park in a far away parking spot instead of the closest. You ll be surprised at how fast these little changes can make a difference.   Some people really cannot walk very far, and tire out quickly with exercise. Instead of becoming discouraged, resolve to do what you can do, and work to make that a regular frequent habit.    The benefits of exercise  Exercise offers many benefits including:     Exercise increases your metabolism (the speed at which your body burns calories).    Regular exercise can increase the amount of muscle in your body. Muscle burns calories faster than fat. The more muscle you have, the more calories you burn.    Exercise gives you energy and curbs your appetite.    Exercise decreases stress and helps you sleep better. Find out for yourself what time of day works best for you.  Make exercise fun  Exercise can be fun. Choose an activity you enjoy. You may even get a friend to do it with you:     Take  a resistance-training or aerobics class    Join a team sport    Take a dance class    Walk the dog    Ride a bike  If you have health problems, be sure to ask your healthcare provider before you start an exercise program. Have a  help you develop a plan that s safe for you.   [employer] last reviewed this educational content on 4/1/2018 2000-2020 [employer]. All rights reserved. This information is not intended as a substitute for professional medical care. Always follow your healthcare professional's instructions.

## 2021-03-05 NOTE — RESULT ENCOUNTER NOTE
Pranay Jones,    Thank you for your recent office visit.    Here are your recent results.  Your A1C shows that you are diabetic at this time.  We should probably start you on metformin to get your blood sugars down.  This medication could be stopped if we can reverse your type 2 diabetes.     Feel free to contact me via [employer] or call the clinic at [phone].    Sincerely,    ULISES Boston, FNP-BC

## 2021-03-08 LAB — HCV AB SERPL QL IA: NONREACTIVE

## 2021-03-09 DIAGNOSIS — E78.1 HIGH TRIGLYCERIDES: Primary | ICD-10-CM

## 2021-03-09 RX ORDER — METAPROTERENOL SULFATE 10 MG
500 TABLET ORAL DAILY
Qty: 90 CAPSULE | Refills: 1 | Status: SHIPPED | OUTPATIENT
Start: 2021-03-09 | End: 2021-08-27

## 2021-03-09 NOTE — RESULT ENCOUNTER NOTE
Pranay Jones,    Thank you for your recent office visit.    Here are your recent results.  Your liver function is elevated (ALT/AST).  Are you drinking alcohol or taking a lot of tylenol?  I would hold off on both for now.  We can recheck labs- liver function anytime.  We should repeat your A1C in 3 months, please follow up for this as we will probably have to adjust your medications; your diabetes is negatively affecting your kidneys (albumin urine).      Feel free to contact me via [employer] or call the clinic at [phone].    Sincerely,    ULISES Boston, FNP-BC

## 2021-03-29 DIAGNOSIS — I10 ESSENTIAL HYPERTENSION WITH GOAL BLOOD PRESSURE LESS THAN 140/90: ICD-10-CM

## 2021-03-30 RX ORDER — LOSARTAN POTASSIUM 100 MG/1
TABLET ORAL
Qty: 30 TABLET | Refills: 0 | OUTPATIENT
Start: 2021-03-30

## 2021-03-30 NOTE — TELEPHONE ENCOUNTER
Called [facility] pharmacy.  Patient has refills there for a year.  She will get them ready for him.    Anne-Marie SANCHEZN-RN  Triage Nurse  [facility]: [facility]

## 2021-03-31 ENCOUNTER — ALLIED HEALTH/NURSE VISIT (OUTPATIENT)
Dept: NURSING | Facility: CLINIC | Age: 51
End: 2021-03-31
Payer: COMMERCIAL

## 2021-03-31 VITALS — DIASTOLIC BLOOD PRESSURE: 84 MMHG | HEART RATE: 84 BPM | SYSTOLIC BLOOD PRESSURE: 134 MMHG

## 2021-03-31 DIAGNOSIS — Z01.30 BLOOD PRESSURE CHECK: Primary | ICD-10-CM

## 2021-03-31 PROCEDURE — 99207 PR NO CHARGE NURSE ONLY: CPT

## 2021-03-31 NOTE — PROGRESS NOTES
Robert Glynn is a 50 year old patient who comes in today for a Blood Pressure check.  Initial BP:  /84 (BP Location: Right arm, Cuff Size: Adult Large)   Pulse 84      Data Unavailable  Disposition: follow-up as previously indicated by provider

## 2021-04-24 ENCOUNTER — HEALTH MAINTENANCE LETTER (OUTPATIENT)
Age: 51
End: 2021-04-24

## 2021-09-02 DIAGNOSIS — E78.1 HIGH TRIGLYCERIDES: ICD-10-CM

## 2021-09-02 RX ORDER — OMEGA-3/DHA/EPA/FISH OIL 60 MG-90MG
CAPSULE ORAL
Qty: 90 CAPSULE | Refills: 1 | OUTPATIENT
Start: 2021-09-02

## 2021-09-03 DIAGNOSIS — E78.1 HIGH TRIGLYCERIDES: ICD-10-CM

## 2021-09-05 RX ORDER — OMEGA-3/DHA/EPA/FISH OIL 60 MG-90MG
CAPSULE ORAL
Qty: 90 CAPSULE | Refills: 1 | OUTPATIENT
Start: 2021-09-05

## 2021-09-26 ENCOUNTER — OFFICE VISIT (OUTPATIENT)
Dept: URGENT CARE | Facility: URGENT CARE | Age: 51
End: 2021-09-26
Payer: COMMERCIAL

## 2021-09-26 ENCOUNTER — ANCILLARY PROCEDURE (OUTPATIENT)
Dept: GENERAL RADIOLOGY | Facility: CLINIC | Age: 51
End: 2021-09-26
Attending: FAMILY MEDICINE
Payer: COMMERCIAL

## 2021-09-26 VITALS
SYSTOLIC BLOOD PRESSURE: 138 MMHG | DIASTOLIC BLOOD PRESSURE: 82 MMHG | OXYGEN SATURATION: 97 % | TEMPERATURE: 98.5 F | HEART RATE: 89 BPM

## 2021-09-26 DIAGNOSIS — M54.50 ACUTE MIDLINE LOW BACK PAIN WITHOUT SCIATICA: Primary | ICD-10-CM

## 2021-09-26 DIAGNOSIS — R80.9 TYPE 2 DIABETES MELLITUS WITH MICROALBUMINURIA, WITHOUT LONG-TERM CURRENT USE OF INSULIN (H): ICD-10-CM

## 2021-09-26 DIAGNOSIS — E11.29 TYPE 2 DIABETES MELLITUS WITH MICROALBUMINURIA, WITHOUT LONG-TERM CURRENT USE OF INSULIN (H): ICD-10-CM

## 2021-09-26 DIAGNOSIS — M54.50 ACUTE MIDLINE LOW BACK PAIN WITHOUT SCIATICA: ICD-10-CM

## 2021-09-26 LAB
BASOPHILS # BLD AUTO: 0 10E3/UL (ref 0–0.2)
BASOPHILS NFR BLD AUTO: 0 %
EOSINOPHIL # BLD AUTO: 0.1 10E3/UL (ref 0–0.7)
EOSINOPHIL NFR BLD AUTO: 1 %
ERYTHROCYTE [DISTWIDTH] IN BLOOD BY AUTOMATED COUNT: 11.4 % (ref 10–15)
HCT VFR BLD AUTO: 43.8 % (ref 40–53)
HGB BLD-MCNC: 15.2 G/DL (ref 13.3–17.7)
LYMPHOCYTES # BLD AUTO: 2.1 10E3/UL (ref 0.8–5.3)
LYMPHOCYTES NFR BLD AUTO: 22 %
MCH RBC QN AUTO: 33.5 PG (ref 26.5–33)
MCHC RBC AUTO-ENTMCNC: 34.7 G/DL (ref 31.5–36.5)
MCV RBC AUTO: 97 FL (ref 78–100)
MONOCYTES # BLD AUTO: 0.8 10E3/UL (ref 0–1.3)
MONOCYTES NFR BLD AUTO: 8 %
NEUTROPHILS # BLD AUTO: 6.6 10E3/UL (ref 1.6–8.3)
NEUTROPHILS NFR BLD AUTO: 69 %
PLATELET # BLD AUTO: 170 10E3/UL (ref 150–450)
RBC # BLD AUTO: 4.54 10E6/UL (ref 4.4–5.9)
WBC # BLD AUTO: 9.6 10E3/UL (ref 4–11)

## 2021-09-26 PROCEDURE — 99214 OFFICE O/P EST MOD 30 MIN: CPT | Performed by: FAMILY MEDICINE

## 2021-09-26 PROCEDURE — 36415 COLL VENOUS BLD VENIPUNCTURE: CPT | Performed by: FAMILY MEDICINE

## 2021-09-26 PROCEDURE — 72100 X-RAY EXAM L-S SPINE 2/3 VWS: CPT | Performed by: RADIOLOGY

## 2021-09-26 PROCEDURE — 85025 COMPLETE CBC W/AUTO DIFF WBC: CPT | Performed by: FAMILY MEDICINE

## 2021-09-26 RX ORDER — HYDROCODONE BITARTRATE AND ACETAMINOPHEN 5; 325 MG/1; MG/1
1 TABLET ORAL EVERY 6 HOURS PRN
Qty: 10 TABLET | Refills: 0 | Status: SHIPPED | OUTPATIENT
Start: 2021-09-26 | End: 2021-09-29

## 2021-09-26 RX ORDER — CYCLOBENZAPRINE HCL 5 MG
TABLET ORAL
Qty: 60 TABLET | Refills: 0 | Status: SHIPPED | OUTPATIENT
Start: 2021-09-26 | End: 2023-09-06

## 2021-09-26 NOTE — PATIENT INSTRUCTIONS
Patient Education     General Neck and Back Pain    Both neck and back pain are usually caused by injury to the muscles or ligaments of the spine. Sometimes the disks that separate each bone of the spine may cause pain by pressing on a nearby nerve. Back and neck pain may appear after a sudden twisting or bending force (such as in a car accident), or sometimes after a simple awkward movement. In either case, muscle spasm is often present and adds to the pain.   Acute neck and back pain usually gets better in 1 to 2 weeks. Pain related to disk disease, arthritis in the spinal joints, or narrowing of the spinal canal (spinal stenosis) can become chronic and last for months or years.   Back and neck pain are common problems. Most people feel better in 1 or 2 weeks, and most of the rest in 1 to 2 months. Most people can remain active.   People have and describe pain differently.    Pain can be sharp, stabbing, shooting, aching, cramping, or burning    Movement, standing, bending, lifting, sitting, or walking may worsen the pain    Pain can be limited to one spot or area, or it can be more generalized    Pain can spread upward, downward, to the front, or go down your arms or legs    Muscle spasm may occur.  Most of the time mechanical problems with the muscles or spine cause the pain. It's usually caused by an injury, whether known or not, to the muscles or ligaments. Pain without an injury is not common. But it can sometimes be caused by a health problem such as kidney stones or an infection. Pain is usually related to physical activity such as sports, exercise, work, or normal activity. Sometimes it can occur without an identifiable cause. This can happen simply by stretching or moving wrong, without noting pain at the time. Other causes include:     Overexertion, lifting, pushing, pulling incorrectly or too aggressively.    Sudden twisting, bending or stretching from an accident (car or fall), or accidental  movement.    Poor posture    Poor conditioning, lack of regular exercise    Spinal disc disease or arthritis    Stress    Pregnancy, or illness like appendicitis, bladder or kidney infection, pelvic infections   Home care    For neck pain: Use a comfortable pillow that supports the head and keeps the spine in a neutral position. The position of the head should not be tilted forward or backward.    When in bed, try to find a position of comfort. A firm mattress is best. Try lying flat on your back with pillows under your knees. You can also try lying on your side with your knees bent up towards your chest and a pillow between your knees.    At first, don't try to stretch out the sore spots. If there is a strain, it's not like the good soreness you get after exercising without an injury. In this case, stretching may make it worse.    Don't sit for long periods, as in long car rides or other travel. This puts more stress on the lower back than standing or walking.    During the first 24 to 72 hours after an injury, apply an ice pack to the painful area for 20 minutes and then remove it for 20 minutes over a period of 60 to 90 minutes or several times a day.     You can alternate ice and heat therapies. Talk with your healthcare provider about the best treatment for your back or neck pain. As a safety precaution, don't use a heating pad at bedtime. Sleeping with a heating pad can lead to skin burns or tissue damage.    Therapeutic massage can help relax the back and neck muscles without stretching them.    Be aware of safe lifting methods and don't lift anything over 15 pounds until all the pain is gone.    Medicines  Talk to your healthcare provider before using medicine, especially if you have other medical problems or are taking other medicines.     You may use over-the-counter medicine to control pain, unless another pain medicine was prescribed. Talk with your doctor first if you have chronic conditions like  diabetes, liver or kidney disease, stomach ulcers, gastrointestinal bleeding, or are taking blood thinner medicines.    Be careful if you are given pain medicines, narcotics, or medicine for muscle spasm. They can cause drowsiness, and can affect your coordination, reflexes, and judgment. Don't drive or operate heavy machinery.  Follow-up care  Follow up with your healthcare provider, or as advised. You may need physical therapy or further tests.   If X-rays were taken, you will be told of any new findings that may affect your care.   Call 911  Call 911 if any of the following occur:     Trouble breathing    Confusion    Very drowsy or trouble awakening    Fainting or loss of consciousness    Rapid or very slow heart rate    Loss of bowel or bladder control  When to seek medical advice  Call your healthcare provider right away if any of these occur:    Pain becomes worse or spreads into your arms or legs    Weakness, numbness or pain in one or both arms or legs    Numbness in the groin area    Trouble walking    Fever of 100.4 F (38 C) or higher, or as directed by your healthcare provider  [employer] last reviewed this educational content on 10/1/2019    4918-8567 [employer]. All rights reserved. This information is not intended as a substitute for professional medical care. Always follow your healthcare professional's instructions.           Patient Education     Flexeril Oral Tablet 5 mg  Uses  This medicine is used for the following purposes:    inflammation    muscle spasms  Instructions  This medicine may be taken with or without food.  Keep the medicine at room temperature. Avoid heat and direct light.  If you forget to take a dose on time, take it as soon as you remember. If it is almost time for the next dose, do not take the missed dose. Return to your normal dosing schedule. Do not take 2 doses of this medicine at one time.  Please tell your doctor and pharmacist about all the medicines you  take. Include both prescription and over-the-counter medicines. Also tell them about any vitamins, herbal medicines, or anything else you take for your health.  Cautions  Tell your doctor and pharmacist if you ever had an allergic reaction to a medicine. Symptoms of an allergic reaction can include trouble breathing, skin rash, itching, swelling, or severe dizziness.  Do not use the medication any more than instructed.  Your ability to stay alert or to react quickly may be impaired by this medicine. Do not drive or operate machinery until you know how this medicine will affect you.  Do not drink beverages with alcohol while on this medicine.  Avoid becoming overheated during exercise or other activities. Try to stay cool in hot weather.  Tell the doctor or pharmacist if you are pregnant, planning to be pregnant, or breastfeeding.  Ask your pharmacist if this medicine can interact with any of your other medicines. Be sure to tell them about all the medicines you take.  Do not start or stop any other medicines without first speaking to your doctor or pharmacist.  Do not share this medicine with anyone who has not been prescribed this medicine.  Side Effects  The following is a list of some common side effects from this medicine. Please speak with your doctor about what you should do if you experience these or other side effects.    constipation    dizziness    drowsiness or sedation    dry mouth    lack of energy and tiredness  A few people may have an allergic reactions to this medicine. Symptoms can include difficulty breathing, skin rash, itching, swelling, or severe dizziness. If you notice any of these symptoms, seek medical help quickly.  Extra  Please speak with your doctor, nurse, or pharmacist if you have any questions about this medicine.  [email or web address]  IMPORTANT NOTE: This document tells you briefly how to take your medicine, but it does not tell you all there is to know  about it.Your doctor or pharmacist may give you other documents about your medicine. Please talk to them if you have any questions.Always follow their advice. There is a more complete description of this medicine available in English.Scan this code on your smartphone or tablet or use the web address below. You can also ask your pharmacist for a printout. If you have any questions, please ask your pharmacist.     2021 [employer].           Patient Education     Vicodin Oral Tablet 5 mg/300 mg  Uses  For pain.  Instructions  This medicine may be taken with or without food.  Keep the medicine at room temperature. Avoid heat and direct light.  To reduce constipation, eat high fiber foods, drink plenty of water and exercise.  Please tell your doctor and pharmacist about all the medicines you take. Include both prescription and over-the-counter medicines. Also tell them about any vitamins, herbal medicines, or anything else you take for your health.  It is very important that you follow your doctor's instructions for all blood tests.  Do not take more than 8 pills in a day.  Cautions  This medicine contains an opioid. Though it helps many people, this medicine may sometimes cause addiction, especially if it is used for a long time. This risk for addiction may be higher if you have a substance use disorder - such as overuse of or addiction to drugs or alcohol. Speak with your doctor about the benefits and risks of using this medicine.  Ask your doctor or pharmacist if you should have naloxone on hand to treat opioid overdose. Teach your family or household members about the signs of an opioid overdose and how to treat it.  Tell your doctor and pharmacist if you ever had an allergic reaction to a medicine. Symptoms of an allergic reaction can include trouble breathing, skin rash, itching, swelling, or severe dizziness.  Do not use the medication any more than instructed.  If possible, avoid using with marijuana or  other medicines that can cause dizziness or drowsiness. These include allergy/cold products, muscle relaxers, sleep aids, and pain relievers.  Your ability to stay alert or to react quickly may be impaired by this medicine. Do not operate machinery or drive while on this medicine.  Do not drink beverages with alcohol while on this medicine.  Tell the doctor or pharmacist if you are pregnant, planning to be pregnant, or breastfeeding.  This medicine can hurt a new baby in the womb. If you become pregnant while on this medicine, tell your doctor immediately. Your doctor may switch you to a different medicine.  Ask your pharmacist if this medicine can interact with any of your other medicines. Be sure to tell them about all the medicines you take.  Please tell all your doctors and dentists that you are on this medicine before they provide care.  Do not start or stop any other medicines without first speaking to your doctor or pharmacist.  This medicine should be used with caution in patients with breathing difficulties.  Call your doctor right away if you notice slow or shallow breathing.  Do not share this medicine with anyone who has not been prescribed this medicine.  This medicine contains acetaminophen. There are many medicines with acetaminophen. Taking these medicines together can cause you to get too much acetaminophen. This can cause serious liver problems. Look carefully on the package of all your medicines to see if acetaminophen is included. Ask your pharmacist which medicines you can take safely.  Side Effects  The following is a list of some common side effects from this medicine. Please speak with your doctor about what you should do if you experience these or other side effects.    constipation    dizziness    drowsiness or sedation    lack of energy and tiredness    itching    liver problems    nausea    skin irritation such as redness, itching, rash, or burning    stomach upset or abdominal  pain    sweating    vomiting  If you have any of the following side effects, you may be getting too much medicine. Please contact your doctor to let them know about these side effects.    changes in memory, mood, or thinking    difficulty concentrating    confusion    fainting    slow heartbeat    low blood pressure    muscle weakness    cold, moist skin    blurring or changes of vision  Call your doctor or get medical help right away if you notice any of these more serious side effects:    decreased awareness or responsiveness    breathing interruption during sleep    shallow, irregular breathing    chest pain    hallucinations (unusual thoughts, seeing or hearing things that are not real)    symptoms of liver damage (such as yellowing of skin or eyes, dark urine, unusual tiredness or weakness; severe stomach or back pain)    pale or blue skin, lips or fingernails    seizures    shortness of breath    light colored stool    difficulty or discomfort urinating    severe or persistent vomiting  A few people may have an allergic reactions to this medicine. Symptoms can include difficulty breathing, skin rash, itching, swelling, or severe dizziness. If you notice any of these symptoms, seek medical help quickly.  Extra  Please speak with your doctor, nurse, or pharmacist if you have any questions about this medicine.  [email or web address]  IMPORTANT NOTE: This document tells you briefly how to take your medicine, but it does not tell you all there is to know about it.Your doctor or pharmacist may give you other documents about your medicine. Please talk to them if you have any questions.Always follow their advice. There is a more complete description of this medicine available in English.Scan this code on your smartphone or tablet or use the web address below. You can also ask your pharmacist for a printout. If you have any questions, please ask your pharmacist.     2021 [employer]

## 2021-09-26 NOTE — LETTER
[facility] [place]  [address] [facility] [address]  Phone: [phone]    September 26, 2021        Robert Glynn  [id number] [facility] [address]          To whom it may concern:    RE: Robert Glynn    Patient was seen and treated today at our clinic.  Recommend staying home 9/27 and 9/28/2021  Recommend follow up with primary care provider if symptoms persist    Please contact me for questions or concerns.      Sincerely,        Viviane Choi MD

## 2021-10-09 ENCOUNTER — HEALTH MAINTENANCE LETTER (OUTPATIENT)
Age: 51
End: 2021-10-09

## 2021-12-17 ENCOUNTER — E-VISIT (OUTPATIENT)
Dept: URGENT CARE | Facility: CLINIC | Age: 51
End: 2021-12-17
Payer: COMMERCIAL

## 2021-12-17 DIAGNOSIS — U07.1 INFECTION DUE TO 2019 NOVEL CORONAVIRUS: Primary | ICD-10-CM

## 2021-12-17 PROCEDURE — 99421 OL DIG E/M SVC 5-10 MIN: CPT | Performed by: PHYSICIAN ASSISTANT

## 2021-12-17 NOTE — PATIENT INSTRUCTIONS
Dear Robert Glynn,    Based on the details you've shared, you do not need to be tested at this time and may continue to work.     Several reasons that people seek testing but where testing is not currently recommended:     You have not had direct contact with someone who has a confirmed (tested) positive case of Covid-19. An example is if your family member (e.g. child) was exposed to someone with Covid-19 and you have been exposed to your family member, THEY should get tested and you only need to be tested if they are positive.     We do not currently recommend testing for people who will be coming in contact with high risk people if you do not have symptoms right now (e.g. we do not test people prior to going to visit elderly or sick relatives).     We do not recommend testing for people who previously tested positive to see if they are still contagious. This is because the test can remain positive for up to 90 days after the first Covid test. We consider you non-contagious 10 days after your covid symptoms started or 20 days if you are immunocompromised. So you may return to normal activities 10 days (or 20 if immunocompromised) after symptoms started and do not need to be re-tested.     Please submit a new visit or call your clinic if you think we missed needed information, your exposure information has changed, or you develop symptoms.    What are the symptoms of COVID-19?  The most common symptoms are cough, fever and trouble breathing. Less common symptoms include headache, body aches, fatigue (feeling very tired), chills, sore throat, stuffy or runny nose, diarrhea (loose poop), loss of taste or smell, belly pain, and nausea or vomiting (feeling sick to your stomach or throwing up).    Where can I get more information?  [facility] - About COVID-19: [email or web address]  CDC - What to Do If You're Sick: www.cdc.gov/coronavirus/2019-ncov/about/steps-when-sick.html  CDC - [facility]  Isolation: www.cdc.gov/coronavirus/2019-ncov/hcp/disposition-in-home-patients.html  CDC - Caring for Someone: www.cdc.gov/coronavirus/2019-ncov/if-you-are-sick/care-for-someone.html  [facility] - Interim Guidance for Hospital Discharge to Home: www.health.[facility].[place]/diseases/coronavirus/hcp/hospdischarge.pdf  [facility] clinical trials (COVID-19 research studies): [email or web address]-clinical-trials  Below are the COVID-19 hotlines at the [place] Department of Health ([facility]). Interpreters are available.  For health questions: Call 021-857-1005 or 1-614.421.8667 (7 a.m. to 7 p.m.)  For questions about schools and childcare: Call 851-403-7875 or 1-531.795.3890 (7 a.m. to 7 p.m.)  December 17, 2021    RE:  Robert Glynn                                                                                                                                                       [address] [address]        To whom it may concern:    I evaluated Robert LIVE Glynn on 12/17/21.  Second COVID test not indicated as it may be artificially positive for up to 90 days post initial test.     Sincerely,  Demetrius Briones PA-C

## 2022-02-15 DIAGNOSIS — E78.1 HIGH TRIGLYCERIDES: ICD-10-CM

## 2022-02-15 DIAGNOSIS — E78.5 HYPERLIPIDEMIA LDL GOAL <100: ICD-10-CM

## 2022-02-17 RX ORDER — ATORVASTATIN CALCIUM 20 MG/1
TABLET, FILM COATED ORAL
Qty: 90 TABLET | Refills: 0 | Status: SHIPPED | OUTPATIENT
Start: 2022-02-17 | End: 2022-04-28

## 2022-02-17 RX ORDER — OMEGA-3/DHA/EPA/FISH OIL 60 MG-90MG
CAPSULE ORAL
Qty: 90 CAPSULE | Refills: 0 | Status: SHIPPED | OUTPATIENT
Start: 2022-02-17 | End: 2022-05-19

## 2022-04-21 NOTE — PROGRESS NOTES
SUBJECTIVE:   CC: Robert Glynn is an 51 year old male who presents for preventative health visit.       Patient has been advised of split billing requirements and indicates understanding: Yes  Healthy Habits:     Getting at least 3 servings of Calcium per day:  Yes    Bi-annual eye exam:  NO    Dental care twice a year:  Yes    Sleep apnea or symptoms of sleep apnea:  Excessive snoring    Diet:  Regular (no restrictions)    Frequency of exercise:  6-7 days/week    Duration of exercise:  30-45 minutes    Taking medications regularly:  Yes    Medication side effects:  None    PHQ-2 Total Score: 0    Additional concerns today:  No    No concerns brought up to Rooming staff. Sylvie Sanabria MA       Did not realize that he was supposed to take 2 tablets metformin BID until recently. Has been tolerating 1 tablet BID. Will see where A1C is today.  Mental health is doing well.       Today's PHQ-2 Score:   PHQ-2 ( 1999 Pfizer) 4/27/2022   Q1: Little interest or pleasure in doing things 0   Q2: Feeling down, depressed or hopeless 0   PHQ-2 Score 0   PHQ-2 Total Score (12-17 Years)- Positive if 3 or more points; Administer PHQ-A if positive -   Q1: Little interest or pleasure in doing things Not at all   Q2: Feeling down, depressed or hopeless Not at all   PHQ-2 Score 0       Abuse: Current or Past(Physical, Sexual or Emotional)- No  Do you feel safe in your environment? Yes        Social History     Tobacco Use     Smoking status: Never Smoker     Smokeless tobacco: Never Used   Substance Use Topics     Alcohol use: Yes     Comment: occ         Alcohol Use 4/27/2022   Prescreen: >3 drinks/day or >7 drinks/week? Yes   Prescreen: >3 drinks/day or >7 drinks/week? -   AUDIT SCORE  8       Last PSA:   PSA   Date Value Ref Range Status   03/05/2021 0.22 0 - 4 ug/L Final     Comment:     Assay Method:  Chemiluminescence using Siemens Vista analyzer       Reviewed orders with patient. Reviewed health maintenance and updated orders  accordingly - Yes  Lab work is in process  Labs reviewed in EPIC  BP Readings from Last 3 Encounters:   04/28/22 138/78   09/26/21 138/82   03/31/21 134/84    Wt Readings from Last 3 Encounters:   04/28/22 105.8 kg (233 lb 3.2 oz)   03/05/21 105.6 kg (232 lb 12.8 oz)   01/14/20 110.8 kg (244 lb 3.2 oz)                  Patient Active Problem List   Diagnosis     Type 2 diabetes mellitus with microalbuminuria, without long-term current use of insulin (H)     Essential hypertension with goal blood pressure less than 140/90     Hyperlipidemia LDL goal <100     Microalbuminuria     CKD (chronic kidney disease) stage 1, GFR 90 ml/min or greater     History reviewed. No pertinent surgical history.    Social History     Tobacco Use     Smoking status: Never Smoker     Smokeless tobacco: Never Used   Substance Use Topics     Alcohol use: Yes     Comment: occ     Family History   Problem Relation Age of Onset     Hypertension Mother      Hyperlipidemia Mother      Hypertension Father      Hyperlipidemia Father      Bladder Cancer Father      Bone Cancer Father      Hypertension Maternal Grandmother      Hyperlipidemia Maternal Grandmother      Hypertension Maternal Grandfather      Hyperlipidemia Maternal Grandfather      Hypertension Paternal Grandmother      Hyperlipidemia Paternal Grandmother      Hypertension Paternal Grandfather      Hyperlipidemia Paternal Grandfather      Hypertension Brother      Hyperlipidemia Brother      Hypertension Brother      Hyperlipidemia Brother      Hypertension Brother      Hyperlipidemia Brother          Current Outpatient Medications   Medication Sig Dispense Refill     aspirin 81 MG tablet Take by mouth daily       atorvastatin (LIPITOR) 20 MG tablet Take 1 tablet (20 mg) by mouth daily 90 tablet 3     cyclobenzaprine (FLEXERIL) 5 MG tablet May take 1 or 2 tablets 3 times a day as needed for muscle spasm and pain. Sedating. Preferably take at bedtime 60 tablet 0     fish oil-omega-3  fatty acids 500 MG capsule TAKE ONE CAPSULE BY MOUTH ONCE DAILY 90 capsule 0     losartan (COZAAR) 100 MG tablet TAKE ONE TABLET BY MOUTH ONCE DAILY 90 tablet 3     metFORMIN (GLUCOPHAGE) 500 MG tablet Take 2 tablets (1,000 mg) by mouth 2 times daily (with meals) 360 tablet 3     triamterene-HCTZ (MAXZIDE-25) 37.5-25 MG tablet Take 1 tablet by mouth daily 90 tablet 3     triamterene-HCTZ (MAXZIDE-25) 37.5-25 MG tablet TAKE ONE TABLET BY MOUTH ONCE DAILY 30 tablet 0     No Known Allergies  Recent Labs   Lab Test 04/28/22  0813 03/05/21  0830 01/14/20  0757 04/15/19  1444 09/25/18  0932   A1C 10.0* 11.0* 7.0*   < > 6.0*   LDL  --  Cannot estimate LDL when triglyceride exceeds 400 mg/dL  110* 90  --  104*   HDL  --  40 45  --  53   TRIG  --  497* 250*  --  244*   ALT  --  81* 65  --  55   CR  --  0.69 0.79   < > 0.88   GFRESTIMATED  --  >90 >90   < > >90   GFRESTBLACK  --  >90 >90   < > >90   POTASSIUM  --  3.6 3.8   < > 3.9   TSH  --  1.46  --   --  1.25    < > = values in this interval not displayed.        Reviewed and updated as needed this visit by clinical staff   Tobacco  Allergies  Meds  Problems  Med Hx  Surg Hx  Fam Hx  Soc   Hx          Reviewed and updated as needed this visit by Provider   Tobacco  Allergies  Meds  Problems  Med Hx  Surg Hx  Fam Hx           Past Medical History:   Diagnosis Date     Diabetes (H) 9.22.16     Hypertension       History reviewed. No pertinent surgical history.    Review of Systems   Constitutional: Negative for chills and fever.   HENT: Negative for congestion, ear pain, hearing loss and sore throat.    Eyes: Negative for pain and visual disturbance.   Respiratory: Negative for cough and shortness of breath.    Cardiovascular: Negative for chest pain, palpitations and peripheral edema.   Gastrointestinal: Negative for abdominal pain, constipation, diarrhea, heartburn, hematochezia and nausea.   Genitourinary: Negative for dysuria, frequency, genital sores,  "hematuria, impotence, penile discharge and urgency.   Musculoskeletal: Negative for arthralgias, joint swelling and myalgias.   Skin: Negative for rash.   Neurological: Negative for dizziness, weakness, headaches and paresthesias.   Psychiatric/Behavioral: Negative for mood changes. The patient is not nervous/anxious.      CONSTITUTIONAL: NEGATIVE for fever, chills, change in weight  INTEGUMENTARY/SKIN: NEGATIVE for worrisome rashes, moles or lesions  EYES: NEGATIVE for vision changes or irritation  ENT: NEGATIVE for ear, mouth and throat problems  RESP: NEGATIVE for significant cough or SOB  CV: NEGATIVE for chest pain, palpitations or peripheral edema  GI: NEGATIVE for nausea, abdominal pain, heartburn, or change in bowel habits   male: negative for dysuria, hematuria, decreased urinary stream, erectile dysfunction, urethral discharge  MUSCULOSKELETAL: NEGATIVE for significant arthralgias or myalgia  NEURO: NEGATIVE for weakness, dizziness or paresthesias  ENDOCRINE: NEGATIVE for temperature intolerance, skin/hair changes  HEME/ALLERGY/IMMUNE: NEGATIVE for bleeding problems  PSYCHIATRIC: NEGATIVE for changes in mood or affect    OBJECTIVE:   /78   Pulse 93   Temp 97.7  F (36.5  C) (Tympanic)   Resp 20   Ht 1.784 m (5' 10.24\")   Wt 105.8 kg (233 lb 3.2 oz)   SpO2 96%   BMI 33.24 kg/m      Physical Exam  GENERAL: healthy, alert and no distress  EYES: Eyes grossly normal to inspection, PERRL and conjunctivae and sclerae normal  HENT: ear canals and TM's normal, nose and mouth without ulcers or lesions  NECK: no adenopathy, no asymmetry, masses, or scars and thyroid normal to palpation  RESP: lungs clear to auscultation - no rales, rhonchi or wheezes  CV: regular rate and rhythm, normal S1 S2, no S3 or S4, no murmur, click or rub, no peripheral edema and peripheral pulses strong  ABDOMEN: soft, nontender, no hepatosplenomegaly, no masses and bowel sounds normal   (male): deferred per guidelines- " asymptomatic per pt  MS: no gross musculoskeletal defects noted, no edema, no CVA tenderness  SKIN: no suspicious lesions or rashes  NEURO: Normal strength and tone, cranial nerves intact, mentation intact and speech normal  PSYCH: mentation appears normal, affect normal/bright  LYMPH: no cervical, supraclavicular, axillary adenopathy    Diagnostic Test Results:  Labs reviewed in Epic  See orders    ASSESSMENT/PLAN:       ICD-10-CM    1. High priority for 2019-nCoV vaccine  Z23 COVID-19,PF,PFIZER (12+ Yrs GRAY LABEL)   2. Routine general medical examination at a health care facility  Z00.00    3. Screen for colon cancer  Z12.11 Adult Gastro Ref - Procedure Only   4. Screening for hyperlipidemia  Z13.220 Lipid panel reflex to direct LDL Fasting     Lipid panel reflex to direct LDL Fasting   5. Screening for prostate cancer  Z12.5 PROSTATE SPEC ANTIGEN SCREEN     PROSTATE SPEC ANTIGEN SCREEN   6. Hyperlipidemia LDL goal <100  E78.5 atorvastatin (LIPITOR) 20 MG tablet   7. Essential hypertension with goal blood pressure less than 140/90  I10 losartan (COZAAR) 100 MG tablet     triamterene-HCTZ (MAXZIDE-25) 37.5-25 MG tablet   8. Type 2 diabetes mellitus with microalbuminuria, without long-term current use of insulin (H)  E11.29 OPTOMETRY REFERRAL    R80.9 HEMOGLOBIN A1C     COMPREHENSIVE METABOLIC PANEL     Albumin Random Urine Quantitative with Creat Ratio     metFORMIN (GLUCOPHAGE) 500 MG tablet     Albumin Random Urine Quantitative with Creat Ratio     COMPREHENSIVE METABOLIC PANEL     HEMOGLOBIN A1C   9. CKD (chronic kidney disease) stage 1, GFR 90 ml/min or greater  N18.1 Albumin Random Urine Quantitative with Creat Ratio   10. Nondependent alcohol abuse, episodic drinking behavior  F10.10     drinks 3 alcoholic drinks daily, does take breaks from drinking.        Patient has been advised of split billing requirements and indicates understanding: Yes    COUNSELING:   Reviewed preventive health counseling, as  "reflected in patient instructions  as is for now.  Try to increase daily exercise, consider low salt diet.  Check your BP at home 1-2 times weekly.  If consistently > 140/80 after sitting 5 minutes, let me know.     Estimated body mass index is 33.24 kg/m  as calculated from the following:    Height as of this encounter: 1.784 m (5' 10.24\").    Weight as of this encounter: 105.8 kg (233 lb 3.2 oz).     Weight management plan: Discussed healthy diet and exercise guidelines  He does walk his dog almost daily.  Volunteer for [employer].   He reports that he has never smoked. He has never used smokeless tobacco.      Counseling Resources:  ATP IV Guidelines  Pooled Cohorts Equation Calculator  FRAX Risk Assessment  ICSI Preventive Guidelines  Dietary Guidelines for Americans, 2010  USDA's MyPlate  ASA Prophylaxis  Lung CA Screening    MERRITT Siegel  [facility] AM[facility]  "

## 2022-04-21 NOTE — PATIENT INSTRUCTIONS
I do not want to bottom out your blood pressure, so lets keep your meds as is for now.  Try to increase daily exercise, consider low salt diet.  Check your BP at home 1-2 times weekly.  If consistently > 140/80 after sitting 5 minutes, let me know.     Preventive Health Recommendations  Male Ages 50 - 64    Yearly exam:             See your health care provider every year in order to  o   Review health changes.   o   Discuss preventive care.    o   Review your medicines if your doctor has prescribed any.   Have a cholesterol test every 5 years, or more frequently if you are at risk for high cholesterol/heart disease.   Have a diabetes test (fasting glucose) every three years. If you are at risk for diabetes, you should have this test more often.   Have a colonoscopy at age 50, or have a yearly FIT test (stool test). These exams will check for colon cancer.    Talk with your health care provider about whether or not a prostate cancer screening test (PSA) is right for you.  You should be tested each year for STDs (sexually transmitted diseases), if you re at risk.     Shots: Get a flu shot each year. Get a tetanus shot every 10 years.     Nutrition:  Eat at least 5 servings of fruits and vegetables daily.   Eat whole-grain bread, whole-wheat pasta and brown rice instead of white grains and rice.   Get adequate Calcium and Vitamin D.     Lifestyle  Exercise for at least 150 minutes a week (30 minutes a day, 5 days a week). This will help you control your weight and prevent disease.   Limit alcohol to one drink per day.   No smoking.   Wear sunscreen to prevent skin cancer.   See your dentist every six months for an exam and cleaning.   See your eye doctor every 1 to 2 years.

## 2022-04-27 ASSESSMENT — ENCOUNTER SYMPTOMS
MYALGIAS: 0
PARESTHESIAS: 0
FREQUENCY: 0
CHILLS: 0
JOINT SWELLING: 0
FEVER: 0
DIZZINESS: 0
DIARRHEA: 0
PALPITATIONS: 0
HEADACHES: 0
SORE THROAT: 0
SHORTNESS OF BREATH: 0
NERVOUS/ANXIOUS: 0
HEARTBURN: 0
DYSURIA: 0
WEAKNESS: 0
NAUSEA: 0
CONSTIPATION: 0
ABDOMINAL PAIN: 0
ARTHRALGIAS: 0
EYE PAIN: 0
HEMATURIA: 0
HEMATOCHEZIA: 0
COUGH: 0

## 2022-04-28 ENCOUNTER — OFFICE VISIT (OUTPATIENT)
Dept: FAMILY MEDICINE | Facility: CLINIC | Age: 52
End: 2022-04-28
Payer: COMMERCIAL

## 2022-04-28 VITALS
WEIGHT: 233.2 LBS | SYSTOLIC BLOOD PRESSURE: 138 MMHG | RESPIRATION RATE: 20 BRPM | BODY MASS INDEX: 33.39 KG/M2 | OXYGEN SATURATION: 96 % | DIASTOLIC BLOOD PRESSURE: 78 MMHG | HEIGHT: 70 IN | HEART RATE: 93 BPM | TEMPERATURE: 97.7 F

## 2022-04-28 DIAGNOSIS — Z23 HIGH PRIORITY FOR 2019-NCOV VACCINE: Primary | ICD-10-CM

## 2022-04-28 DIAGNOSIS — Z00.00 ROUTINE GENERAL MEDICAL EXAMINATION AT A HEALTH CARE FACILITY: ICD-10-CM

## 2022-04-28 DIAGNOSIS — R80.9 TYPE 2 DIABETES MELLITUS WITH MICROALBUMINURIA, WITHOUT LONG-TERM CURRENT USE OF INSULIN (H): ICD-10-CM

## 2022-04-28 DIAGNOSIS — E78.5 HYPERLIPIDEMIA LDL GOAL <100: ICD-10-CM

## 2022-04-28 DIAGNOSIS — I10 ESSENTIAL HYPERTENSION WITH GOAL BLOOD PRESSURE LESS THAN 140/90: ICD-10-CM

## 2022-04-28 DIAGNOSIS — Z13.220 SCREENING FOR HYPERLIPIDEMIA: ICD-10-CM

## 2022-04-28 DIAGNOSIS — Z12.11 SCREEN FOR COLON CANCER: ICD-10-CM

## 2022-04-28 DIAGNOSIS — E11.29 TYPE 2 DIABETES MELLITUS WITH MICROALBUMINURIA, WITHOUT LONG-TERM CURRENT USE OF INSULIN (H): ICD-10-CM

## 2022-04-28 DIAGNOSIS — N18.1 CKD (CHRONIC KIDNEY DISEASE) STAGE 1, GFR 90 ML/MIN OR GREATER: ICD-10-CM

## 2022-04-28 DIAGNOSIS — F10.10 NONDEPENDENT ALCOHOL ABUSE, EPISODIC DRINKING BEHAVIOR: ICD-10-CM

## 2022-04-28 DIAGNOSIS — Z12.5 SCREENING FOR PROSTATE CANCER: ICD-10-CM

## 2022-04-28 LAB
ALBUMIN SERPL-MCNC: 4 G/DL (ref 3.4–5)
ALP SERPL-CCNC: 55 U/L (ref 40–150)
ALT SERPL W P-5'-P-CCNC: 83 U/L (ref 0–70)
ANION GAP SERPL CALCULATED.3IONS-SCNC: 8 MMOL/L (ref 3–14)
AST SERPL W P-5'-P-CCNC: 71 U/L (ref 0–45)
BILIRUB SERPL-MCNC: 1.5 MG/DL (ref 0.2–1.3)
BUN SERPL-MCNC: 16 MG/DL (ref 7–30)
CALCIUM SERPL-MCNC: 9.7 MG/DL (ref 8.5–10.1)
CHLORIDE BLD-SCNC: 101 MMOL/L (ref 94–109)
CHOLEST SERPL-MCNC: 206 MG/DL
CO2 SERPL-SCNC: 27 MMOL/L (ref 20–32)
CREAT SERPL-MCNC: 0.83 MG/DL (ref 0.66–1.25)
CREAT UR-MCNC: 392 MG/DL
FASTING STATUS PATIENT QL REPORTED: YES
GFR SERPL CREATININE-BSD FRML MDRD: >90 ML/MIN/1.73M2
GLUCOSE BLD-MCNC: 276 MG/DL (ref 70–99)
HBA1C MFR BLD: 10 % (ref 0–5.6)
HDLC SERPL-MCNC: 48 MG/DL
LDLC SERPL CALC-MCNC: 101 MG/DL
MICROALBUMIN UR-MCNC: 408 MG/L
MICROALBUMIN/CREAT UR: 104.08 MG/G CR (ref 0–17)
NONHDLC SERPL-MCNC: 158 MG/DL
POTASSIUM BLD-SCNC: 3.6 MMOL/L (ref 3.4–5.3)
PROT SERPL-MCNC: 8 G/DL (ref 6.8–8.8)
PSA SERPL-MCNC: 0.21 UG/L (ref 0–4)
SODIUM SERPL-SCNC: 136 MMOL/L (ref 133–144)
TRIGL SERPL-MCNC: 286 MG/DL

## 2022-04-28 PROCEDURE — 80053 COMPREHEN METABOLIC PANEL: CPT | Performed by: NURSE PRACTITIONER

## 2022-04-28 PROCEDURE — G0103 PSA SCREENING: HCPCS | Performed by: NURSE PRACTITIONER

## 2022-04-28 PROCEDURE — 99396 PREV VISIT EST AGE 40-64: CPT | Mod: 25 | Performed by: NURSE PRACTITIONER

## 2022-04-28 PROCEDURE — 80061 LIPID PANEL: CPT | Performed by: NURSE PRACTITIONER

## 2022-04-28 PROCEDURE — 83036 HEMOGLOBIN GLYCOSYLATED A1C: CPT | Performed by: NURSE PRACTITIONER

## 2022-04-28 PROCEDURE — 36415 COLL VENOUS BLD VENIPUNCTURE: CPT | Performed by: NURSE PRACTITIONER

## 2022-04-28 PROCEDURE — 82043 UR ALBUMIN QUANTITATIVE: CPT | Performed by: NURSE PRACTITIONER

## 2022-04-28 PROCEDURE — 99214 OFFICE O/P EST MOD 30 MIN: CPT | Mod: 25 | Performed by: NURSE PRACTITIONER

## 2022-04-28 PROCEDURE — 0054A COVID-19,PF,PFIZER (12+ YRS): CPT | Performed by: NURSE PRACTITIONER

## 2022-04-28 PROCEDURE — 91305 COVID-19,PF,PFIZER (12+ YRS): CPT | Performed by: NURSE PRACTITIONER

## 2022-04-28 RX ORDER — ATORVASTATIN CALCIUM 20 MG/1
20 TABLET, FILM COATED ORAL DAILY
Qty: 90 TABLET | Refills: 3 | Status: SHIPPED | OUTPATIENT
Start: 2022-04-28 | End: 2023-06-01

## 2022-04-28 RX ORDER — LOSARTAN POTASSIUM 100 MG/1
TABLET ORAL
Qty: 90 TABLET | Refills: 3 | Status: SHIPPED | OUTPATIENT
Start: 2022-04-28 | End: 2023-05-02

## 2022-04-28 RX ORDER — TRIAMTERENE/HYDROCHLOROTHIAZID 37.5-25 MG
1 TABLET ORAL DAILY
Qty: 90 TABLET | Refills: 3 | Status: SHIPPED | OUTPATIENT
Start: 2022-04-28 | End: 2023-05-02

## 2022-04-28 ASSESSMENT — ENCOUNTER SYMPTOMS
HEARTBURN: 0
DYSURIA: 0
WEAKNESS: 0
ABDOMINAL PAIN: 0
HEADACHES: 0
DIZZINESS: 0
SHORTNESS OF BREATH: 0
CHILLS: 0
NAUSEA: 0
DIARRHEA: 0
PALPITATIONS: 0
JOINT SWELLING: 0
NERVOUS/ANXIOUS: 0
HEMATOCHEZIA: 0
ARTHRALGIAS: 0
COUGH: 0
PARESTHESIAS: 0
MYALGIAS: 0
CONSTIPATION: 0
EYE PAIN: 0
HEMATURIA: 0
SORE THROAT: 0
FEVER: 0
FREQUENCY: 0

## 2022-04-28 ASSESSMENT — PAIN SCALES - GENERAL: PAINLEVEL: NO PAIN (0)

## 2022-04-29 NOTE — RESULT ENCOUNTER NOTE
Pranay Jones,    Thank you for your recent office visit.    Here are your recent results.  Your diabetes has worsened quite a bit.  Do you want me to send in an additional medication for you to start for this?  This is also why your urine albumin has worsened- signaling the beginning of kidney damage r/t diabetes. Your liver enzymes are also elevated (AST/ALT), consider cutting back on alcohol. Let me know what you think.     Feel free to contact me via [employer] or call the clinic at [phone].    Sincerely,    ULISES Boston, FNP-BC     negative No chest wall deformities/Normal respiratory pattern

## 2022-05-04 DIAGNOSIS — R80.9 TYPE 2 DIABETES MELLITUS WITH MICROALBUMINURIA, WITHOUT LONG-TERM CURRENT USE OF INSULIN (H): ICD-10-CM

## 2022-05-04 DIAGNOSIS — R79.89 ELEVATED LIVER FUNCTION TESTS: ICD-10-CM

## 2022-05-04 DIAGNOSIS — N18.1 CKD (CHRONIC KIDNEY DISEASE) STAGE 1, GFR 90 ML/MIN OR GREATER: Primary | ICD-10-CM

## 2022-05-04 DIAGNOSIS — E78.1 HIGH TRIGLYCERIDES: ICD-10-CM

## 2022-05-04 DIAGNOSIS — F10.10 NONDEPENDENT ALCOHOL ABUSE, EPISODIC DRINKING BEHAVIOR: ICD-10-CM

## 2022-05-04 DIAGNOSIS — E11.29 TYPE 2 DIABETES MELLITUS WITH MICROALBUMINURIA, WITHOUT LONG-TERM CURRENT USE OF INSULIN (H): ICD-10-CM

## 2022-05-17 DIAGNOSIS — E78.1 HIGH TRIGLYCERIDES: ICD-10-CM

## 2022-05-19 RX ORDER — OMEGA-3/DHA/EPA/FISH OIL 60 MG-90MG
CAPSULE ORAL
Qty: 90 CAPSULE | Refills: 0 | Status: SHIPPED | OUTPATIENT
Start: 2022-05-19 | End: 2022-08-17

## 2022-09-17 ENCOUNTER — HEALTH MAINTENANCE LETTER (OUTPATIENT)
Age: 52
End: 2022-09-17

## 2023-01-05 ENCOUNTER — VIRTUAL VISIT (OUTPATIENT)
Dept: FAMILY MEDICINE | Facility: CLINIC | Age: 53
End: 2023-01-05

## 2023-01-05 ENCOUNTER — TELEPHONE (OUTPATIENT)
Dept: FAMILY MEDICINE | Facility: CLINIC | Age: 53
End: 2023-01-05

## 2023-01-05 DIAGNOSIS — U07.1 COVID-19 VIRUS INFECTION: Primary | ICD-10-CM

## 2023-01-05 PROCEDURE — 99213 OFFICE O/P EST LOW 20 MIN: CPT | Mod: CS | Performed by: PHYSICIAN ASSISTANT

## 2023-01-05 NOTE — PATIENT INSTRUCTIONS
Jones Jones,    Thank you for allowing [facility] to manage your care.    Discontinue atorvastatin for 10 days after beginning Paxlovid.    If you develop worsening/changing symptoms at any time, please call 911 or go to the emergency department for evaluation.    I sent your prescriptions to your pharmacy.    Drink 8-10 glasses of fluid daily to stay well-hydrated.    For your pain, please use Tylenol 650mg every 6 hours. You may use 400mg of ibuprofen between doses of Tylenol.     Max acetaminophen (Tylenol) 4,000mg/24 hours  Max ibuprofen 3,000mg/24 hours    If you have any questions or concerns, please feel free to call us at [phone]    Sincerely,    Grady Ferro PA-C    Did you know?      You can schedule a video visit for follow-up appointments as well as future appointments for certain conditions.  Please see the below link.     [email or web address]    If you have not already done so,  I encourage you to sign up for [facility] ([email or web address]).  This will allow you to review your results, securely communicate with a provider, and schedule virtual visits as well.

## 2023-01-05 NOTE — PROGRESS NOTES
"Robert is a 52 year old who is being evaluated via a billable telephone visit.      What phone number would you like to be contacted at? [phone]  How would you like to obtain your AVS? Nicolasa  Distant Location (provider location):  On-site    Assessment & Plan   Problem List Items Addressed This Visit    None  Visit Diagnoses     COVID-19 virus infection    -  Primary    Relevant Medications    nirmatrelvir and ritonavir (PAXLOVID) therapy pack         Impression is mild COVID-19. Positive home test. Fully vaccinated and boosted x 1. Sounds well and non-toxic and I have low suspicion for impending airway obstruction or respiratory distress at this point.  He will push p.o. fluids, use over-the-counter meds for symptoms, complete a course of Paxlovid after discussing risks/benefits, and follow-up with us in 2 weeks if not improving or urgent care/the ER if symptoms worsen/change at any time.    DDx and Dx discussed with and explained to the pt to their satisfaction.  All questions were answered at this time. Pt expressed understanding of and agreement with this dx, tx, and plan. No further workup warranted and standard medication warnings given. I have given the patient a list of pertinent indications for re-evaluation. Will go to the Emergency Department if symptoms worsen or new concerning symptoms arise. Patient left the call in no apparent distress.     Prescription drug management  13 minutes spent on the date of the encounter doing chart review, history and exam, documentation and further activities per the note     BMI:   Estimated body mass index is 33.24 kg/m  as calculated from the following:    Height as of 4/28/22: 1.784 m (5' 10.24\").    Weight as of 4/28/22: 105.8 kg (233 lb 3.2 oz).     See Patient Instructions  COVID-19 positive patient.  Encounter for consideration of medication intervention. Patient does qualify for a prescription. Full discussion with patient including medication options, " "risks and benefits. Potential drug interactions reviewed with patient.     Treatment Planned Paxlovid  Temporary change to home medications:   Discontinue atorvastatin for 10 days after beginning Paxlovid.    Estimated body mass index is 33.24 kg/m  as calculated from the following:    Height as of 4/28/22: 1.784 m (5' 10.24\").    Weight as of 4/28/22: 105.8 kg (233 lb 3.2 oz).  GFR Estimate   Date Value Ref Range Status   04/28/2022 >90 >60 mL/min/1.73m2 Final     Comment:     Effective December 21, 2021 eGFRcr in adults is calculated using the 2021 CKD-EPI creatinine equation which includes age and gender (Sherlyn et al., NEJM, DOI: 10.1056/VBNKez6719005)  This result was previously suppressed from the chart.   03/05/2021 >90 >60 mL/min/[1.73_m2] Final     Comment:     Non  GFR Calc  Starting 12/18/2018, serum creatinine based estimated GFR (eGFR) will be   calculated using the Chronic Kidney Disease Epidemiology Collaboration   (CKD-EPI) equation.       No results found for: [id number]    Return in about 2 weeks (around 1/19/2023) for a recheck of your symptoms if not improving, or call 911/go to an ER anytime if worsening.    JESUS Rodriguez  [facility] AMY Jones is a 52 year old presenting for the following health issues:  No chief complaint on file.      HPI       COVID-19 Symptom Review  How many days ago did these symptoms start? 01/04/2023 positive covid same day    Are any of the following symptoms significant for you?    New or worsening difficulty breathing? No    Worsening cough? Yes, I am coughing up mucus.    Fever or chills? No    Headache: No    Sore throat: No    Chest pain: No    Diarrhea: No    Body aches? No    What treatments has patient tried? Acetaminophen   Does patient live in a nursing home, group home, or shelter? No  Does patient have a way to get food/medications during quarantined? Yes, I have a friend or family member who can help " me.  Had COVID one year ago.     Review of Systems   Constitutional, HEENT, cardiovascular, pulmonary, gi and gu systems are negative, except as otherwise noted.      Objective           Vitals:  No vitals were obtained today due to virtual visit.    Physical Exam   healthy, alert and no distress  PSYCH: Alert and oriented times 3; coherent speech, normal   rate and volume, able to articulate logical thoughts, able   to abstract reason, no tangential thoughts, no hallucinations   or delusions  His affect is normal and pleasant  RESP: No cough, no audible wheezing, able to talk in full sentences  Remainder of exam unable to be completed due to telephone visits          Phone call duration: 9 minutes

## 2023-01-05 NOTE — TELEPHONE ENCOUNTER
RN COVID TREATMENT VISIT  01/05/23    Robert Glynn  52 year old  Current weight? 230    Has the patient been seen by a primary care provider at an [facility] or [facility] within the past two years? Yes.   Have you been in close proximity to/do you have a known exposure to a person with a confirmed case of influenza? No.     Date of positive COVID test (PCR or at home)?  Home test 1/4/23    Current COVID symptoms: fatigue, muscle or body aches and congestion or runny nose    Date COVID symptoms began: 1/4/23    Do you have any of the following conditions that place you at risk of being very sick from COVID-19? chronic kidney disease, diabetes and overweight (BMI>25)    Is patient eligible to continue? Yes, established patient, 12 years or older weighing at least 88.2 lbs, who has COVID symptoms that started in the past 5 days and is at risk for being very sick from COVID-19.       Have you received monoclonal antibodies or oral antiviral medications since testing positive to COVID-19? No    Are you currently hospitalized for COVID-19? No    Do you have a history of hepatitis? No    Are you currently pregnant or nursing? No    Do you have a clinically significant hypersensitivity to nirmatrelvir, ritonavir, or molnupiravir? No    Do you have any history of severe renal impairment (eGFR < 30mL/min)? No    Do you have any history of hepatic impairment or abnormalities (e.g. hepatic panel, ALT, AST, ALK Phos, bilirubin)? YES    Have you had a coronary stent placed in the previous 6 months? No    Is patient eligible to continue? No, patient does not meet all eligibility requirements for the RN COVID treatment (as denoted by yes response(s) above). Patient informed they will need a virtual provider visit to assess treatment options.  Patient will be transferred to a  at the end of this call. Renuka Stevens RN

## 2023-01-23 ENCOUNTER — HEALTH MAINTENANCE LETTER (OUTPATIENT)
Age: 53
End: 2023-01-23

## 2023-05-02 DIAGNOSIS — I10 ESSENTIAL HYPERTENSION WITH GOAL BLOOD PRESSURE LESS THAN 140/90: ICD-10-CM

## 2023-05-02 RX ORDER — LOSARTAN POTASSIUM 100 MG/1
TABLET ORAL
Qty: 90 TABLET | Refills: 0 | Status: SHIPPED | OUTPATIENT
Start: 2023-05-02 | End: 2023-08-14

## 2023-05-02 RX ORDER — TRIAMTERENE/HYDROCHLOROTHIAZID 37.5-25 MG
1 TABLET ORAL DAILY
Qty: 90 TABLET | Refills: 0 | Status: SHIPPED | OUTPATIENT
Start: 2023-05-02 | End: 2023-08-14

## 2023-06-03 ENCOUNTER — HEALTH MAINTENANCE LETTER (OUTPATIENT)
Age: 53
End: 2023-06-03

## 2023-07-29 ENCOUNTER — HEALTH MAINTENANCE LETTER (OUTPATIENT)
Age: 53
End: 2023-07-29

## 2023-08-08 DIAGNOSIS — I10 ESSENTIAL HYPERTENSION WITH GOAL BLOOD PRESSURE LESS THAN 140/90: ICD-10-CM

## 2023-08-08 DIAGNOSIS — E11.29 TYPE 2 DIABETES MELLITUS WITH MICROALBUMINURIA, WITHOUT LONG-TERM CURRENT USE OF INSULIN (H): ICD-10-CM

## 2023-08-08 DIAGNOSIS — R80.9 TYPE 2 DIABETES MELLITUS WITH MICROALBUMINURIA, WITHOUT LONG-TERM CURRENT USE OF INSULIN (H): ICD-10-CM

## 2023-08-08 DIAGNOSIS — E78.5 HYPERLIPIDEMIA LDL GOAL <100: ICD-10-CM

## 2023-08-08 DIAGNOSIS — E78.1 HIGH TRIGLYCERIDES: ICD-10-CM

## 2023-08-14 RX ORDER — TRIAMTERENE/HYDROCHLOROTHIAZID 37.5-25 MG
1 TABLET ORAL DAILY
Qty: 90 TABLET | Refills: 0 | Status: SHIPPED | OUTPATIENT
Start: 2023-08-14 | End: 2023-09-06

## 2023-08-14 RX ORDER — OMEGA-3/DHA/EPA/FISH OIL 60 MG-90MG
CAPSULE ORAL
Qty: 130 CAPSULE | Refills: 0 | Status: SHIPPED | OUTPATIENT
Start: 2023-08-14

## 2023-08-14 RX ORDER — LOSARTAN POTASSIUM 100 MG/1
TABLET ORAL
Qty: 90 TABLET | Refills: 0 | Status: SHIPPED | OUTPATIENT
Start: 2023-08-14 | End: 2023-09-06

## 2023-08-14 RX ORDER — ATORVASTATIN CALCIUM 20 MG/1
TABLET, FILM COATED ORAL
Qty: 20 TABLET | Refills: 0 | Status: SHIPPED | OUTPATIENT
Start: 2023-08-14 | End: 2023-09-06

## 2023-09-06 ENCOUNTER — TELEPHONE (OUTPATIENT)
Dept: FAMILY MEDICINE | Facility: CLINIC | Age: 53
End: 2023-09-06

## 2023-09-06 ENCOUNTER — OFFICE VISIT (OUTPATIENT)
Dept: FAMILY MEDICINE | Facility: CLINIC | Age: 53
End: 2023-09-06
Payer: COMMERCIAL

## 2023-09-06 VITALS
DIASTOLIC BLOOD PRESSURE: 88 MMHG | HEIGHT: 70 IN | WEIGHT: 229.4 LBS | HEART RATE: 52 BPM | RESPIRATION RATE: 17 BRPM | TEMPERATURE: 97.3 F | OXYGEN SATURATION: 99 % | SYSTOLIC BLOOD PRESSURE: 132 MMHG | BODY MASS INDEX: 32.84 KG/M2

## 2023-09-06 DIAGNOSIS — Z12.5 SCREENING FOR PROSTATE CANCER: ICD-10-CM

## 2023-09-06 DIAGNOSIS — R80.9 TYPE 2 DIABETES MELLITUS WITH MICROALBUMINURIA, WITHOUT LONG-TERM CURRENT USE OF INSULIN (H): Primary | ICD-10-CM

## 2023-09-06 DIAGNOSIS — E11.29 TYPE 2 DIABETES MELLITUS WITH MICROALBUMINURIA, WITHOUT LONG-TERM CURRENT USE OF INSULIN (H): Primary | ICD-10-CM

## 2023-09-06 DIAGNOSIS — Z12.11 SCREEN FOR COLON CANCER: ICD-10-CM

## 2023-09-06 DIAGNOSIS — I10 ESSENTIAL HYPERTENSION WITH GOAL BLOOD PRESSURE LESS THAN 140/90: ICD-10-CM

## 2023-09-06 DIAGNOSIS — Z23 NEED FOR VACCINATION: ICD-10-CM

## 2023-09-06 DIAGNOSIS — E78.5 HYPERLIPIDEMIA LDL GOAL <100: ICD-10-CM

## 2023-09-06 DIAGNOSIS — E11.29 TYPE 2 DIABETES MELLITUS WITH MICROALBUMINURIA, WITHOUT LONG-TERM CURRENT USE OF INSULIN (H): ICD-10-CM

## 2023-09-06 DIAGNOSIS — R80.9 TYPE 2 DIABETES MELLITUS WITH MICROALBUMINURIA, WITHOUT LONG-TERM CURRENT USE OF INSULIN (H): ICD-10-CM

## 2023-09-06 LAB — HBA1C MFR BLD: 9.9 % (ref 0–5.6)

## 2023-09-06 PROCEDURE — 99207 PR FOOT EXAM NO CHARGE: CPT | Performed by: FAMILY MEDICINE

## 2023-09-06 PROCEDURE — 36415 COLL VENOUS BLD VENIPUNCTURE: CPT | Performed by: FAMILY MEDICINE

## 2023-09-06 PROCEDURE — 99214 OFFICE O/P EST MOD 30 MIN: CPT | Mod: 25 | Performed by: FAMILY MEDICINE

## 2023-09-06 PROCEDURE — 90677 PCV20 VACCINE IM: CPT | Performed by: FAMILY MEDICINE

## 2023-09-06 PROCEDURE — 80061 LIPID PANEL: CPT | Performed by: FAMILY MEDICINE

## 2023-09-06 PROCEDURE — 83036 HEMOGLOBIN GLYCOSYLATED A1C: CPT | Performed by: FAMILY MEDICINE

## 2023-09-06 PROCEDURE — G0103 PSA SCREENING: HCPCS | Performed by: FAMILY MEDICINE

## 2023-09-06 PROCEDURE — 82043 UR ALBUMIN QUANTITATIVE: CPT | Performed by: FAMILY MEDICINE

## 2023-09-06 PROCEDURE — 82570 ASSAY OF URINE CREATININE: CPT | Performed by: FAMILY MEDICINE

## 2023-09-06 PROCEDURE — 80053 COMPREHEN METABOLIC PANEL: CPT | Performed by: FAMILY MEDICINE

## 2023-09-06 PROCEDURE — 90471 IMMUNIZATION ADMIN: CPT | Performed by: FAMILY MEDICINE

## 2023-09-06 RX ORDER — ATORVASTATIN CALCIUM 20 MG/1
20 TABLET, FILM COATED ORAL DAILY
Qty: 90 TABLET | Refills: 3 | Status: SHIPPED | OUTPATIENT
Start: 2023-09-06 | End: 2023-09-07

## 2023-09-06 RX ORDER — TRIAMTERENE/HYDROCHLOROTHIAZID 37.5-25 MG
1 TABLET ORAL DAILY
Qty: 90 TABLET | Refills: 3 | Status: SHIPPED | OUTPATIENT
Start: 2023-09-06 | End: 2024-08-28

## 2023-09-06 RX ORDER — EMPAGLIFLOZIN AND METFORMIN HYDROCHLORIDE 5; 1000 MG/1; MG/1
1 TABLET ORAL 2 TIMES DAILY WITH MEALS
Qty: 180 TABLET | Refills: 3 | Status: SHIPPED | OUTPATIENT
Start: 2023-09-06 | End: 2024-09-19

## 2023-09-06 RX ORDER — LOSARTAN POTASSIUM 100 MG/1
TABLET ORAL
Qty: 90 TABLET | Refills: 3 | Status: SHIPPED | OUTPATIENT
Start: 2023-09-06 | End: 2024-08-28

## 2023-09-06 ASSESSMENT — PAIN SCALES - GENERAL: PAINLEVEL: NO PAIN (0)

## 2023-09-06 NOTE — PROGRESS NOTES
"  Assessment & Plan     Type 2 diabetes mellitus with microalbuminuria, without long-term current use of insulin (H), uncontrolled with an A1c of 9.9.  Goal A1c < 7.0  - Adult Eye  Referral  - HEMOGLOBIN A1C  - COMPREHENSIVE METABOLIC PANEL  - Albumin Random Urine Quantitative with Creat Ratio  - FOOT EXAM  -Augment metformin with Synjardy. Rx sent.   -Continue therapeutic lifestyle changes  -Follow-up in 3 months with a repeat A1c    Essential hypertension with goal blood pressure less than 140/90, controlled  -Refill: Losartan (COZAAR) 100 MG tablet  Dispense: 90 tablet; Refill: 3  -Refill: triamterene-HCTZ (MAXZIDE-25) 37.5-25 MG tablet  Dispense: 90 tablet; Refill: 3    Hyperlipidemia LDL goal <100  - COMPREHENSIVE METABOLIC PANEL  - Lipid panel reflex to direct LDL Non-fasting  -Refill: Atorvastatin (LIPITOR) 20 MG tablet  Dispense: 90 tablet; Refill: 3    Screen for colon cancer  - Colonoscopy Screening  Referral    Screening for prostate cancer  - PROSTATE SPEC ANTIGEN SCREEN    Need for vaccination  - REVIEW OF HEALTH MAINTENANCE PROTOCOL ORDERS  - PNEUMOCOCCAL 20 VALENT CONJUGATE (PREVNAR 20)       BMI:   Estimated body mass index is 33.21 kg/m  as calculated from the following:    Height as of this encounter: 1.77 m (5' 9.69\").    Weight as of this encounter: 104.1 kg (229 lb 6.4 oz).   Weight management plan: Discussed healthy diet and exercise guidelines      Return in about 3 months (around 12/6/2023) for Diabetes Follow Up with a HgbA1C prior to visit.      Geeta Perez MD  [facility] AMY Jones is a 52 year old, presenting for the following health issues:  Recheck Medication        9/6/2023    10:20 AM   Additional Questions   Roomed by Velvet MARROQUIN   Accompanied by self       History of Present Illness       Hyperlipidemia:  He presents for follow up of hyperlipidemia.   He is taking medication to lower cholesterol. He is not having myalgia or other " side effects to statin medications.    Hypertension: He presents for follow up of hypertension.  He does not check blood pressure  regularly outside of the clinic. Outpatient blood pressures have not been over 140/90. He does not follow a low salt diet.     He eats 2-3 servings of fruits and vegetables daily.He consumes 0 sweetened beverage(s) daily.He exercises with enough effort to increase his heart rate 20 to 29 minutes per day.  He exercises with enough effort to increase his heart rate 7 days per week.   He is taking medications regularly.     Patient is new to me.  Overdue for chronic disease management.  Med refill request.    Diabetes Follow-up  Currently on metformin 1000 mg twice daily.  Tolerating well, no side effects reported.  Last A1c on file-  Component      Latest Ref Rng 4/28/2022  8:13 AM   Hemoglobin A1C      0.0 - 5.6 % 10.0 (H)     Due for labs today.  Fasting.    How often are you checking your blood sugar? Not at all  What concerns do you have today about your diabetes? None   Do you have any of these symptoms? (Select all that apply)  No numbness or tingling in feet.  No redness, sores or blisters on feet.  No complaints of excessive thirst.  No reports of blurry vision.  No significant changes to weight.  Have you had a diabetic eye exam in the last 12 months? No      BP Readings from Last 2 Encounters:   09/06/23 132/88   04/28/22 138/78     Hemoglobin A1C (%)   Date Value   09/06/2023 9.9 (H)   04/28/2022 10.0 (H)   03/05/2021 11.0 (H)   01/14/2020 7.0 (H)     LDL Cholesterol Calculated (mg/dL)   Date Value   04/28/2022 101 (H)   03/05/2021     Cannot estimate LDL when triglyceride exceeds 400 mg/dL   01/14/2020 90     LDL Cholesterol Direct (mg/dL)   Date Value   03/05/2021 110 (H)           Hyperlipidemia Follow-Up    Are you regularly taking any medication or supplement to lower your cholesterol?   Yes- atorvastatin 20 mg daily  Are you having muscle aches or other side effects that  "you think could be caused by your cholesterol lowering medication?  No    Hypertension Follow-up  Currently on losartan 100 mg daily and triamterene hydrochlorothiazide 37.5-25 mg/day-tolerating well, no side effects reported.  BP Readings from Last 3 Encounters:   09/06/23 132/88   04/28/22 138/78   09/26/21 138/82     Do you check your blood pressure regularly outside of the clinic? No   Are you following a low salt diet? Yes  Are your blood pressures ever more than 140 on the top number (systolic) OR more   than 90 on the bottom number (diastolic), for example 140/90? No      HEALTH CARE MAINTENANCE: Due for colon cancer screening, wishes to do prostate cancer screening, due for labs and vaccines      Review of Systems   Constitutional, HEENT, cardiovascular, pulmonary, gi and gu systems are negative, except as otherwise noted.      Objective    /88 (BP Location: Left arm, Cuff Size: Adult Regular)   Pulse 52   Temp 97.3  F (36.3  C) (Temporal)   Resp 17   Ht 1.77 m (5' 9.69\")   Wt 104.1 kg (229 lb 6.4 oz)   SpO2 99%   BMI 33.21 kg/m    Body mass index is 33.21 kg/m .  Physical Exam   GENERAL: healthy, alert and no distress  RESP: lungs clear to auscultation - no rales, rhonchi or wheezes  CV: regular rate and rhythm, normal S1 S2, no S3 or S4, no murmur, click or rub, no peripheral edema and peripheral pulses strong  Diabetic foot exam: normal DP and PT pulses, no trophic changes or ulcerative lesions, normal sensory exam, and normal monofilament exam    DATA  Previous labs reviewed.  Labs drawn-      Results for orders placed or performed in visit on 09/06/23   HEMOGLOBIN A1C     Status: Abnormal   Result Value Ref Range    Hemoglobin A1C 9.9 (H) 0.0 - 5.6 %             "

## 2023-09-07 DIAGNOSIS — E78.5 HYPERLIPIDEMIA LDL GOAL <100: ICD-10-CM

## 2023-09-07 LAB
ALBUMIN SERPL BCG-MCNC: 4.7 G/DL (ref 3.5–5.2)
ALP SERPL-CCNC: 53 U/L (ref 40–129)
ALT SERPL W P-5'-P-CCNC: 58 U/L (ref 0–70)
ANION GAP SERPL CALCULATED.3IONS-SCNC: 14 MMOL/L (ref 7–15)
AST SERPL W P-5'-P-CCNC: 59 U/L (ref 0–45)
BILIRUB SERPL-MCNC: 1.3 MG/DL
BUN SERPL-MCNC: 25.8 MG/DL (ref 6–20)
CALCIUM SERPL-MCNC: 9.9 MG/DL (ref 8.6–10)
CHLORIDE SERPL-SCNC: 99 MMOL/L (ref 98–107)
CHOLEST SERPL-MCNC: 183 MG/DL
CREAT SERPL-MCNC: 1.17 MG/DL (ref 0.67–1.17)
CREAT UR-MCNC: 160 MG/DL
DEPRECATED HCO3 PLAS-SCNC: 23 MMOL/L (ref 22–29)
EGFRCR SERPLBLD CKD-EPI 2021: 75 ML/MIN/1.73M2
GLUCOSE SERPL-MCNC: 285 MG/DL (ref 70–99)
HDLC SERPL-MCNC: 36 MG/DL
LDLC SERPL CALC-MCNC: 96 MG/DL
MICROALBUMIN UR-MCNC: 57.5 MG/L
MICROALBUMIN/CREAT UR: 35.94 MG/G CR (ref 0–17)
NONHDLC SERPL-MCNC: 147 MG/DL
POTASSIUM SERPL-SCNC: 4.2 MMOL/L (ref 3.4–5.3)
PROT SERPL-MCNC: 7.5 G/DL (ref 6.4–8.3)
PSA SERPL DL<=0.01 NG/ML-MCNC: 0.18 NG/ML (ref 0–3.5)
SODIUM SERPL-SCNC: 136 MMOL/L (ref 136–145)
TRIGL SERPL-MCNC: 257 MG/DL

## 2023-09-07 RX ORDER — ATORVASTATIN CALCIUM 40 MG/1
40 TABLET, FILM COATED ORAL DAILY
Qty: 90 TABLET | Refills: 3 | Status: SHIPPED | OUTPATIENT
Start: 2023-09-07 | End: 2024-09-05

## 2023-09-11 NOTE — TELEPHONE ENCOUNTER
Alternative therapy recently sent.   Please check with patient if he was able to  the Synjardy (Empagliflozin-metformin) from the pharmacy or was it to expensive for him?

## 2023-09-18 ENCOUNTER — TELEPHONE (OUTPATIENT)
Dept: FAMILY MEDICINE | Facility: CLINIC | Age: 53
End: 2023-09-18
Payer: COMMERCIAL

## 2023-09-18 NOTE — TELEPHONE ENCOUNTER
Patient Quality Outreach    Patient is due for the following:   Diabetes -  Eye exam and Urinalysis   Colon Cancer Screening  Physical Preventive Adult Physical      Topic Date Due    Hepatitis B Vaccine (1 of 3 - 3-dose series) Never done    Zoster (Shingles) Vaccine (1 of 2) Never done    COVID-19 Vaccine (4 - Pfizer series) 06/23/2022    Flu Vaccine (1) Never done       Next Steps:   Schedule a Adult Preventative    Type of outreach:    Sent [employer] message.      Questions for provider review:    None           Deya Patricio MA

## 2024-01-16 NOTE — TELEPHONE ENCOUNTER
2nd attempt  Abdoulaye Childress contacted Robert on 03/12/19 and left a message. If patient calls back please schedule appointment as soon as possible for blood pressure recheck on Ancillary.     Airway  Date/Time: 1/16/2024 11:45 AM  Urgency: elective      Staffing  Performed: SHASHANK   Authorized by: Michael Moreno MD    Performed by: SHASHANK Bruno  Patient location during procedure: OR    Indications and Patient Condition  Indications for airway management: anesthesia and airway protection  Spontaneous Ventilation: absent  Sedation level: deep  Preoxygenated: yes  Patient position: sniffing  MILS maintained throughout  Mask difficulty assessment: 2 - vent by mask + OA or adjuvant +/- NMBA  Planned trial extubation    Final Airway Details  Final airway type: endotracheal airway      Successful airway: ETT  Cuffed: yes   Successful intubation technique: direct laryngoscopy  Facilitating devices/methods: intubating stylet  Endotracheal tube insertion site: oral  Blade: Tiara  Blade size: #4  ETT size (mm): 7.5  Cormack-Lehane Classification: grade I - full view of glottis  Placement verified by: chest auscultation   Cuff volume (mL): 5  Measured from: gums  ETT to gums (cm): 22  Number of attempts at approach: 1

## 2024-05-04 ENCOUNTER — HEALTH MAINTENANCE LETTER (OUTPATIENT)
Age: 54
End: 2024-05-04

## 2024-07-13 ENCOUNTER — HEALTH MAINTENANCE LETTER (OUTPATIENT)
Age: 54
End: 2024-07-13

## 2024-08-27 DIAGNOSIS — I10 ESSENTIAL HYPERTENSION WITH GOAL BLOOD PRESSURE LESS THAN 140/90: ICD-10-CM

## 2024-08-28 RX ORDER — LOSARTAN POTASSIUM 100 MG/1
TABLET ORAL
Qty: 90 TABLET | Refills: 0 | Status: SHIPPED | OUTPATIENT
Start: 2024-08-28

## 2024-08-28 RX ORDER — TRIAMTERENE/HYDROCHLOROTHIAZID 37.5-25 MG
1 TABLET ORAL DAILY
Qty: 90 TABLET | Refills: 0 | Status: SHIPPED | OUTPATIENT
Start: 2024-08-28

## 2024-09-04 DIAGNOSIS — E78.5 HYPERLIPIDEMIA LDL GOAL <100: ICD-10-CM

## 2024-09-05 RX ORDER — ATORVASTATIN CALCIUM 40 MG/1
40 TABLET, FILM COATED ORAL DAILY
Qty: 90 TABLET | Refills: 0 | Status: SHIPPED | OUTPATIENT
Start: 2024-09-05

## 2024-09-19 DIAGNOSIS — E11.29 TYPE 2 DIABETES MELLITUS WITH MICROALBUMINURIA, WITHOUT LONG-TERM CURRENT USE OF INSULIN (H): ICD-10-CM

## 2024-09-19 DIAGNOSIS — R80.9 TYPE 2 DIABETES MELLITUS WITH MICROALBUMINURIA, WITHOUT LONG-TERM CURRENT USE OF INSULIN (H): ICD-10-CM

## 2024-09-19 RX ORDER — EMPAGLIFLOZIN AND METFORMIN HYDROCHLORIDE 5; 1000 MG/1; MG/1
1 TABLET ORAL 2 TIMES DAILY WITH MEALS
Qty: 60 TABLET | Refills: 0 | Status: SHIPPED | OUTPATIENT
Start: 2024-09-19

## 2024-10-25 DIAGNOSIS — R80.9 TYPE 2 DIABETES MELLITUS WITH MICROALBUMINURIA, WITHOUT LONG-TERM CURRENT USE OF INSULIN (H): ICD-10-CM

## 2024-10-25 DIAGNOSIS — E11.29 TYPE 2 DIABETES MELLITUS WITH MICROALBUMINURIA, WITHOUT LONG-TERM CURRENT USE OF INSULIN (H): ICD-10-CM

## 2024-10-27 RX ORDER — EMPAGLIFLOZIN AND METFORMIN HYDROCHLORIDE 5; 1000 MG/1; MG/1
1 TABLET ORAL 2 TIMES DAILY WITH MEALS
Qty: 60 TABLET | Refills: 0 | OUTPATIENT
Start: 2024-10-27

## 2024-10-28 NOTE — TELEPHONE ENCOUNTER
Medication refill has been refused. NEEDS APPT.   Schedule an appointment when patient returns call to clinic.    RN may refill for one month once appointment has been made.

## 2024-10-28 NOTE — TELEPHONE ENCOUNTER
Patient has not been seen since 9/6/23.    Appears synjardy was refused.    Attempted to call patient at home/mobile number, no answer, left message on voicemail; patient was instructed to return call to [facility] at [phone].    Jessica CORREA RN  [facility] Triage

## 2024-10-29 DIAGNOSIS — R80.9 TYPE 2 DIABETES MELLITUS WITH MICROALBUMINURIA, WITHOUT LONG-TERM CURRENT USE OF INSULIN (H): ICD-10-CM

## 2024-10-29 DIAGNOSIS — E11.29 TYPE 2 DIABETES MELLITUS WITH MICROALBUMINURIA, WITHOUT LONG-TERM CURRENT USE OF INSULIN (H): ICD-10-CM

## 2024-10-29 RX ORDER — EMPAGLIFLOZIN AND METFORMIN HYDROCHLORIDE 5; 1000 MG/1; MG/1
1 TABLET ORAL 2 TIMES DAILY WITH MEALS
Qty: 60 TABLET | Refills: 0 | Status: SHIPPED | OUTPATIENT
Start: 2024-10-29

## 2024-10-29 NOTE — TELEPHONE ENCOUNTER
Called the patient @     [phone]. Left a VM stating we refilled his medication for a month, but he will need an appt. With Dr. Perez for a med check and further refills.  Tomas Laughlin Registration

## 2024-11-06 DIAGNOSIS — I10 ESSENTIAL HYPERTENSION WITH GOAL BLOOD PRESSURE LESS THAN 140/90: ICD-10-CM

## 2024-11-08 RX ORDER — LOSARTAN POTASSIUM 100 MG/1
TABLET ORAL
Qty: 90 TABLET | Refills: 0 | Status: SHIPPED | OUTPATIENT
Start: 2024-11-08

## 2024-11-08 RX ORDER — TRIAMTERENE AND HYDROCHLOROTHIAZIDE 37.5; 25 MG/1; MG/1
1 TABLET ORAL DAILY
Qty: 90 TABLET | Refills: 0 | Status: SHIPPED | OUTPATIENT
Start: 2024-11-08

## 2024-11-30 ENCOUNTER — HEALTH MAINTENANCE LETTER (OUTPATIENT)
Age: 54
End: 2024-11-30

## 2024-12-03 DIAGNOSIS — E78.5 HYPERLIPIDEMIA LDL GOAL <100: ICD-10-CM

## 2024-12-03 RX ORDER — ATORVASTATIN CALCIUM 40 MG/1
40 TABLET, FILM COATED ORAL DAILY
Qty: 90 TABLET | Refills: 0 | Status: SHIPPED | OUTPATIENT
Start: 2024-12-03

## 2024-12-10 ENCOUNTER — OFFICE VISIT (OUTPATIENT)
Dept: FAMILY MEDICINE | Facility: CLINIC | Age: 54
End: 2024-12-10
Payer: COMMERCIAL

## 2024-12-10 VITALS
SYSTOLIC BLOOD PRESSURE: 124 MMHG | HEIGHT: 70 IN | HEART RATE: 86 BPM | WEIGHT: 229.6 LBS | OXYGEN SATURATION: 97 % | RESPIRATION RATE: 18 BRPM | BODY MASS INDEX: 32.87 KG/M2 | DIASTOLIC BLOOD PRESSURE: 76 MMHG | TEMPERATURE: 97.6 F

## 2024-12-10 DIAGNOSIS — Z12.11 SCREEN FOR COLON CANCER: ICD-10-CM

## 2024-12-10 DIAGNOSIS — R80.9 TYPE 2 DIABETES MELLITUS WITH MICROALBUMINURIA, WITHOUT LONG-TERM CURRENT USE OF INSULIN (H): Primary | ICD-10-CM

## 2024-12-10 DIAGNOSIS — N52.9 ERECTILE DYSFUNCTION, UNSPECIFIED ERECTILE DYSFUNCTION TYPE: ICD-10-CM

## 2024-12-10 DIAGNOSIS — E11.29 TYPE 2 DIABETES MELLITUS WITH MICROALBUMINURIA, WITHOUT LONG-TERM CURRENT USE OF INSULIN (H): Primary | ICD-10-CM

## 2024-12-10 DIAGNOSIS — I10 ESSENTIAL HYPERTENSION WITH GOAL BLOOD PRESSURE LESS THAN 140/90: ICD-10-CM

## 2024-12-10 DIAGNOSIS — E78.5 HYPERLIPIDEMIA LDL GOAL <100: ICD-10-CM

## 2024-12-10 DIAGNOSIS — Z12.5 SCREENING FOR PROSTATE CANCER: ICD-10-CM

## 2024-12-10 LAB
ERYTHROCYTE [DISTWIDTH] IN BLOOD BY AUTOMATED COUNT: 11.4 % (ref 10–15)
EST. AVERAGE GLUCOSE BLD GHB EST-MCNC: 194 MG/DL
HBA1C MFR BLD: 8.4 % (ref 0–5.6)
HCT VFR BLD AUTO: 44.3 % (ref 40–53)
HGB BLD-MCNC: 15.6 G/DL (ref 13.3–17.7)
HOLD SPECIMEN: NORMAL
MCH RBC QN AUTO: 34.1 PG (ref 26.5–33)
MCHC RBC AUTO-ENTMCNC: 35.2 G/DL (ref 31.5–36.5)
MCV RBC AUTO: 97 FL (ref 78–100)
PLATELET # BLD AUTO: 154 10E3/UL (ref 150–450)
RBC # BLD AUTO: 4.57 10E6/UL (ref 4.4–5.9)
WBC # BLD AUTO: 6 10E3/UL (ref 4–11)

## 2024-12-10 PROCEDURE — G0103 PSA SCREENING: HCPCS | Performed by: FAMILY MEDICINE

## 2024-12-10 PROCEDURE — 83036 HEMOGLOBIN GLYCOSYLATED A1C: CPT | Performed by: FAMILY MEDICINE

## 2024-12-10 PROCEDURE — 82570 ASSAY OF URINE CREATININE: CPT | Performed by: FAMILY MEDICINE

## 2024-12-10 PROCEDURE — 84443 ASSAY THYROID STIM HORMONE: CPT | Performed by: FAMILY MEDICINE

## 2024-12-10 PROCEDURE — 82043 UR ALBUMIN QUANTITATIVE: CPT | Performed by: FAMILY MEDICINE

## 2024-12-10 PROCEDURE — 99214 OFFICE O/P EST MOD 30 MIN: CPT | Performed by: FAMILY MEDICINE

## 2024-12-10 PROCEDURE — 85027 COMPLETE CBC AUTOMATED: CPT | Performed by: FAMILY MEDICINE

## 2024-12-10 PROCEDURE — 80061 LIPID PANEL: CPT | Performed by: FAMILY MEDICINE

## 2024-12-10 PROCEDURE — 80053 COMPREHEN METABOLIC PANEL: CPT | Performed by: FAMILY MEDICINE

## 2024-12-10 PROCEDURE — 36415 COLL VENOUS BLD VENIPUNCTURE: CPT | Performed by: FAMILY MEDICINE

## 2024-12-10 RX ORDER — ATORVASTATIN CALCIUM 40 MG/1
40 TABLET, FILM COATED ORAL DAILY
Qty: 90 TABLET | Refills: 3 | Status: SHIPPED | OUTPATIENT
Start: 2024-12-10

## 2024-12-10 RX ORDER — LOSARTAN POTASSIUM 100 MG/1
TABLET ORAL
Qty: 90 TABLET | Refills: 3 | Status: SHIPPED | OUTPATIENT
Start: 2024-12-10

## 2024-12-10 RX ORDER — SILDENAFIL 25 MG/1
25 TABLET, FILM COATED ORAL DAILY PRN
Qty: 10 TABLET | Refills: 0 | Status: SHIPPED | OUTPATIENT
Start: 2024-12-10

## 2024-12-10 RX ORDER — TRIAMTERENE AND HYDROCHLOROTHIAZIDE 37.5; 25 MG/1; MG/1
1 TABLET ORAL DAILY
Qty: 90 TABLET | Refills: 3 | Status: SHIPPED | OUTPATIENT
Start: 2024-12-10

## 2024-12-10 RX ORDER — EMPAGLIFLOZIN AND METFORMIN HYDROCHLORIDE 5; 1000 MG/1; MG/1
1 TABLET ORAL 2 TIMES DAILY WITH MEALS
Qty: 180 TABLET | Refills: 3 | Status: SHIPPED | OUTPATIENT
Start: 2024-12-10

## 2024-12-10 NOTE — PROGRESS NOTES
"  Assessment & Plan     Robert was seen today for recheck medication.    Diagnoses and all orders for this visit:    Type 2 diabetes mellitus with microalbuminuria, without long-term current use of insulin (H), control unknown at this time.  -     HEMOGLOBIN A1C; Future  -     COMPREHENSIVE METABOLIC PANEL; Future  -     Albumin Random Urine Quantitative with Creat Ratio; Future  -     Lipid panel reflex to direct LDL Fasting; Future  -     TSH with free T4 reflex; Future  -     CBC with platelets; Future  -     Adult Eye  Referral; Future  -     Refill: Empagliflozin-metFORMIN (SYNJARDY) 5-1000 MG TABS per tablet; Take 1 tablet by mouth 2 times daily (with meals).  -     Await lab results, may need to adjust Diabetes medications if diabetes remains uncontrolled to improve glycemic control. Patient verbalized understanding.      Hyperlipidemia LDL goal <100  -     Refill: atorvastatin (LIPITOR) 40 MG tablet; Take 1 tablet (40 mg) by mouth daily.    Essential hypertension with goal blood pressure less than 140/90  -     Refill: losartan (COZAAR) 100 MG tablet; TAKE ONE TABLET BY MOUTH ONCE DAILY  -     Refill: triamterene-HCTZ (MAXZIDE-25) 37.5-25 MG tablet; Take 1 tablet by mouth daily.    Screen for colon cancer  -     REVIEW OF HEALTH MAINTENANCE PROTOCOL ORDERS  -     Colonoscopy Screening  Referral; Future    Screening for prostate cancer  -     PROSTATE SPEC ANTIGEN SCREEN    Erectile dysfunction, unspecified erectile dysfunction type  -     IIEF-5 Score = 17, consistent with mild ED    -    Trial: sildenafil (VIAGRA) 25 MG tablet; Take 1 tablet (25 mg) by mouth daily as needed (ED).      Patient education and Handout with home care instructions given. See AVS for details.      BMI  Estimated body mass index is 32.72 kg/m  as calculated from the following:    Height as of this encounter: 1.784 m (5' 10.24\").    Weight as of this encounter: 104.1 kg (229 lb 9.6 oz).   Weight management plan: " Discussed healthy diet and exercise guidelines      Return in about 3 months (around 3/10/2025) for Diabetes Follow Up with a HgbA1C prior to visit.      Levon Jones is a 54 year old, presenting for the following health issues:  Recheck Medication        12/10/2024    10:26 AM   Additional Questions   Roomed by Claudia   Accompanied by Self     History of Present Illness       Reason for visit:  Yearly checkup   He is taking medications regularly.       Diabetes Follow-up  Currently on Empagliflozin-Metformin 5-1000 mg BID.  Tolerating well, no side effects reported.  How often are you checking your blood sugar? Not at all  What concerns do you have today about your diabetes? None   Do you have any of these symptoms? (Select all that apply)  No numbness or tingling in feet.  No redness, sores or blisters on feet.  No complaints of excessive thirst.  No reports of blurry vision.  No significant changes to weight.  Have you had a diabetic eye exam in the last 12 months? No      Hyperlipidemia Follow-Up    Are you regularly taking any medication or supplement to lower your cholesterol?   Yes- Atorvastatin  Are you having muscle aches or other side effects that you think could be caused by your cholesterol lowering medication?  No  Due for lipid panel today-fasting.      Hypertension Follow-up  Currently on Losartan 100 mg daily and Triamterene-Hydrochlorothiazide 37.5-25 mg/day.   Do you check your blood pressure regularly outside of the clinic? No   Are you following a low salt diet? No  Are your blood pressures ever more than 140 on the top number (systolic) OR more   than 90 on the bottom number (diastolic), for example 140/90? No    BP Readings from Last 2 Encounters:   12/10/24 124/76   09/06/23 132/88     Hemoglobin A1C (%)   Date Value   09/06/2023 9.9 (H)   04/28/2022 10.0 (H)   03/05/2021 11.0 (H)   01/14/2020 7.0 (H)     LDL Cholesterol Calculated (mg/dL)   Date Value   09/06/2023 96   04/28/2022 101 (H)  "  03/05/2021     Cannot estimate LDL when triglyceride exceeds 400 mg/dL   01/14/2020 90     LDL Cholesterol Direct (mg/dL)   Date Value   03/05/2021 110 (H)     How many servings of fruits and vegetables do you eat daily?  2-3  On average, how many sweetened beverages do you drink each day (Examples: soda, juice, sweet tea, etc.  Do NOT count diet or artificially sweetened beverages)?   0  How many days per week do you exercise enough to make your heart beat faster? 7  How many minutes a day do you exercise enough to make your heart beat faster? 30 - 60  How many days per week do you miss taking your medication? 1  What makes it hard for you to take your medications?  remembering to take    Additional Concern-    Reports concerns with Erectile Dysfunction.   Describes it \"things are not easy as it used to be to achieve and maintain an erection\"    IIEF-5 Questionnaire    How would you rate your confidence that you could get and keep an erection?  (score :3)  When you had erections with sexual stimulation, how often were your erections hard enough for penetration? (score 3)  During sexual intercourse, how often were you able to maintain your erection after you had been penetrated your partner? (score: 3)  During sexual intercourse, how difficult was it to maintain your erection to completion of intercourse? (score: 4)  When you attempted sexual intercourse, how often was it satisfactory for you? (score: 4)    Total score: 17    1-7: severe ED   8-11: Moderate ED  12-16: Mild to moderate ED  17-21: Mild ED    22-25: No ED    HEALTH CARE MAINTENANCE: Due for Colon cancer screening. Labs- fasting today. Declines vaccines.     Patient Active Problem List   Diagnosis    Type 2 diabetes mellitus with microalbuminuria, without long-term current use of insulin (H)    Essential hypertension with goal blood pressure less than 140/90    Hyperlipidemia LDL goal <100    Microalbuminuria    CKD (chronic kidney disease) stage 1, " GFR 90 ml/min or greater    Nondependent alcohol abuse, episodic drinking behavior     No past surgical history on file.    Social History     Tobacco Use    Smoking status: Never     Passive exposure: Never    Smokeless tobacco: Never   Substance Use Topics    Alcohol use: Yes     Comment: occ     Family History   Problem Relation Age of Onset    Hypertension Mother     Hyperlipidemia Mother     Hypertension Father     Hyperlipidemia Father     Bladder Cancer Father     Bone Cancer Father     Hypertension Maternal Grandmother     Hyperlipidemia Maternal Grandmother     Hypertension Maternal Grandfather     Hyperlipidemia Maternal Grandfather     Hypertension Paternal Grandmother     Hyperlipidemia Paternal Grandmother     Hypertension Paternal Grandfather     Hyperlipidemia Paternal Grandfather     Hypertension Brother     Hyperlipidemia Brother     Hypertension Brother     Hyperlipidemia Brother     Hypertension Brother     Hyperlipidemia Brother          Current Outpatient Medications   Medication Sig Dispense Refill    aspirin 81 MG tablet Take by mouth daily      atorvastatin (LIPITOR) 40 MG tablet Take 1 tablet (40 mg) by mouth daily. 90 tablet 3    empagliflozin-metFORMIN (SYNJARDY) 5-1000 MG TABS per tablet Take 1 tablet by mouth 2 times daily (with meals). 180 tablet 3    fish oil-omega-3 fatty acids 500 MG capsule TAKE ONE CAPSULE BY MOUTH ONCE DAILY 130 capsule 0    losartan (COZAAR) 100 MG tablet TAKE ONE TABLET BY MOUTH ONCE DAILY 90 tablet 3    sildenafil (VIAGRA) 25 MG tablet Take 1 tablet (25 mg) by mouth daily as needed (ED). 10 tablet 0    triamterene-HCTZ (MAXZIDE-25) 37.5-25 MG tablet Take 1 tablet by mouth daily. 90 tablet 3     No Known Allergies  Recent Labs   Lab Test 12/10/24  1020 09/06/23  1113 04/28/22  0813 03/05/21  0830 01/14/20  0757 04/15/19  1444 09/25/18  0932   A1C 8.4* 9.9* 10.0* 11.0* 7.0*   < > 6.0*   LDL  --  96 101* Cannot estimate LDL when triglyceride exceeds 400 mg/dL   "110* 90  --  104*   HDL  --  36* 48 40 45  --  53   TRIG  --  257* 286* 497* 250*  --  244*   ALT  --  58 83* 81* 65  --  55   CR  --  1.17 0.83 0.69 0.79   < > 0.88   GFRESTIMATED  --  75 >90 >90 >90   < > >90   GFRESTBLACK  --   --   --  >90 >90   < > >90   POTASSIUM  --  4.2 3.6 3.6 3.8   < > 3.9   TSH  --   --   --  1.46  --   --  1.25    < > = values in this interval not displayed.            Review of Systems  Constitutional, HEENT, cardiovascular, pulmonary, gi and gu systems are negative, except as otherwise noted.      Objective    /76   Pulse 86   Temp 97.6  F (36.4  C) (Temporal)   Resp 18   Ht 1.784 m (5' 10.24\")   Wt 104.1 kg (229 lb 9.6 oz)   SpO2 97%   BMI 32.72 kg/m    Body mass index is 32.72 kg/m .  Physical Exam   GENERAL: alert and no distress  RESP: lungs clear to auscultation - no rales, rhonchi or wheezes  CV: regular rate and rhythm, normal S1 S2, no S3 or S4, no murmur, click or rub, no peripheral edema  Diabetic foot exam: normal DP and PT pulses, no trophic changes or ulcerative lesions, normal sensory exam, and normal monofilament exam    DATA  Labs drawn and in process.    Signed Electronically by: Geeta Perez MD    "

## 2024-12-11 LAB
ALBUMIN SERPL BCG-MCNC: 4.6 G/DL (ref 3.5–5.2)
ALP SERPL-CCNC: 59 U/L (ref 40–150)
ALT SERPL W P-5'-P-CCNC: 97 U/L (ref 0–70)
ANION GAP SERPL CALCULATED.3IONS-SCNC: 14 MMOL/L (ref 7–15)
AST SERPL W P-5'-P-CCNC: 80 U/L (ref 0–45)
BILIRUB SERPL-MCNC: 1.3 MG/DL
BUN SERPL-MCNC: 18.5 MG/DL (ref 6–20)
CALCIUM SERPL-MCNC: 9.9 MG/DL (ref 8.8–10.4)
CHLORIDE SERPL-SCNC: 100 MMOL/L (ref 98–107)
CHOLEST SERPL-MCNC: 183 MG/DL
CREAT SERPL-MCNC: 0.8 MG/DL (ref 0.67–1.17)
CREAT UR-MCNC: 125 MG/DL
EGFRCR SERPLBLD CKD-EPI 2021: >90 ML/MIN/1.73M2
GLUCOSE SERPL-MCNC: 184 MG/DL (ref 70–99)
HCO3 SERPL-SCNC: 23 MMOL/L (ref 22–29)
HDLC SERPL-MCNC: 45 MG/DL
LDLC SERPL CALC-MCNC: 94 MG/DL
MICROALBUMIN UR-MCNC: 196 MG/L
MICROALBUMIN/CREAT UR: 156.8 MG/G CR (ref 0–17)
NONHDLC SERPL-MCNC: 138 MG/DL
POTASSIUM SERPL-SCNC: 4.1 MMOL/L (ref 3.4–5.3)
PROT SERPL-MCNC: 7.7 G/DL (ref 6.4–8.3)
PSA SERPL DL<=0.01 NG/ML-MCNC: 0.19 NG/ML (ref 0–3.5)
SODIUM SERPL-SCNC: 137 MMOL/L (ref 135–145)
TRIGL SERPL-MCNC: 222 MG/DL
TSH SERPL DL<=0.005 MIU/L-ACNC: 1.43 UIU/ML (ref 0.3–4.2)

## 2024-12-16 ENCOUNTER — TELEPHONE (OUTPATIENT)
Dept: FAMILY MEDICINE | Facility: CLINIC | Age: 54
End: 2024-12-16
Payer: COMMERCIAL

## 2024-12-16 NOTE — TELEPHONE ENCOUNTER
----- Message from Geeta Perez sent at 12/13/2024 12:21 PM CST -----  Robert-    Your recent labs showed-     Prostate cancer screening test was normal.  Thyroid function test was normal.   Complete blood count was normal.      Complete Metabolic Panel (panel that checks liver function, kidney function and electrolytes) was normal except that liver enzymes were elevated.   Lets further evaluate with a liver ultrasound.   Orders are in.  You can schedule an imaging only appointment here at [facility].   In the interim, I recommend you refrain from any alcoholic beverages if at all you drink and continue weight loss efforts.     Urine microalbumin test showed evidence of a blood protein (albumin) in your urine, a complication of diabetes known as microalbuminuria.  It's important that we keep your blood pressure and diabetes under tight control to prevent further kidney damage. Will repeat this urine test in a year.  A1c was 8.4, this improved compared to a year ago but still not yet within goal (< 7.0 %).   I recommend we split Synjardy and increase the dose of Empagliflozin to 25 mg/day and keep the Metformin dose the same-1000 mg twice a day.   New Rx sent.  Schedule follow-up diabetes visit in 3 months.     Please contact me if you have any additional questions or concerns.    Geeta Perez MD

## 2024-12-16 NOTE — TELEPHONE ENCOUNTER
RN spoke to patient to schedule US. He asked for this RN to call him back and leave phone number to schedule US on his VM.     Gloria Hoffmann RN on 12/16/2024 at 11:34 AM

## 2024-12-16 NOTE — TELEPHONE ENCOUNTER
RN called patient and relayed providers message. Patient verbalized understanding.     Patient asking can he do liver US when he has to do 3 month diabetic follow up to try and do this on the same day.     RN stated likely provider will advise patient should do US sooner to further see why liver enzymes are elevated and not wait 3 months to do this. Patient still wanted to check with provider/covering team.       Please advise. Ok to wait and do US with 3 month diabetic follow up or should patient complete this sooner.      Taylor Kirk RN on 12/16/2024 at 9:39 AM

## 2025-03-12 DIAGNOSIS — I10 ESSENTIAL HYPERTENSION WITH GOAL BLOOD PRESSURE LESS THAN 140/90: ICD-10-CM

## 2025-03-12 DIAGNOSIS — R80.9 TYPE 2 DIABETES MELLITUS WITH MICROALBUMINURIA, WITHOUT LONG-TERM CURRENT USE OF INSULIN (H): ICD-10-CM

## 2025-03-12 DIAGNOSIS — E11.29 TYPE 2 DIABETES MELLITUS WITH MICROALBUMINURIA, WITHOUT LONG-TERM CURRENT USE OF INSULIN (H): ICD-10-CM

## 2025-03-12 DIAGNOSIS — E78.5 HYPERLIPIDEMIA LDL GOAL <100: ICD-10-CM

## 2025-03-12 RX ORDER — ATORVASTATIN CALCIUM 40 MG/1
40 TABLET, FILM COATED ORAL DAILY
Qty: 90 TABLET | Refills: 1 | Status: SHIPPED | OUTPATIENT
Start: 2025-03-12

## 2025-03-12 RX ORDER — LOSARTAN POTASSIUM 100 MG/1
TABLET ORAL
Qty: 90 TABLET | Refills: 1 | Status: SHIPPED | OUTPATIENT
Start: 2025-03-12

## 2025-03-12 RX ORDER — TRIAMTERENE AND HYDROCHLOROTHIAZIDE 37.5; 25 MG/1; MG/1
1 TABLET ORAL DAILY
Qty: 90 TABLET | Refills: 1 | Status: SHIPPED | OUTPATIENT
Start: 2025-03-12

## 2025-03-12 RX ORDER — LOSARTAN POTASSIUM 100 MG/1
TABLET ORAL
Qty: 90 TABLET | Refills: 3 | OUTPATIENT
Start: 2025-03-12

## 2025-03-12 RX ORDER — ATORVASTATIN CALCIUM 40 MG/1
40 TABLET, FILM COATED ORAL DAILY
Qty: 90 TABLET | Refills: 3 | OUTPATIENT
Start: 2025-03-12

## 2025-03-12 RX ORDER — TRIAMTERENE AND HYDROCHLOROTHIAZIDE 37.5; 25 MG/1; MG/1
1 TABLET ORAL DAILY
Qty: 90 TABLET | Refills: 3 | OUTPATIENT
Start: 2025-03-12

## 2025-06-03 DIAGNOSIS — E11.29 TYPE 2 DIABETES MELLITUS WITH MICROALBUMINURIA, WITHOUT LONG-TERM CURRENT USE OF INSULIN (H): ICD-10-CM

## 2025-06-03 DIAGNOSIS — R80.9 TYPE 2 DIABETES MELLITUS WITH MICROALBUMINURIA, WITHOUT LONG-TERM CURRENT USE OF INSULIN (H): ICD-10-CM

## 2025-06-04 DIAGNOSIS — E11.29 TYPE 2 DIABETES MELLITUS WITH MICROALBUMINURIA, WITHOUT LONG-TERM CURRENT USE OF INSULIN (H): ICD-10-CM

## 2025-06-04 DIAGNOSIS — R80.9 TYPE 2 DIABETES MELLITUS WITH MICROALBUMINURIA, WITHOUT LONG-TERM CURRENT USE OF INSULIN (H): ICD-10-CM

## 2025-06-28 ENCOUNTER — HEALTH MAINTENANCE LETTER (OUTPATIENT)
Age: 55
End: 2025-06-28

## 2025-07-19 ENCOUNTER — HEALTH MAINTENANCE LETTER (OUTPATIENT)
Age: 55
End: 2025-07-19

## 2025-08-12 DIAGNOSIS — I10 ESSENTIAL HYPERTENSION WITH GOAL BLOOD PRESSURE LESS THAN 140/90: ICD-10-CM

## 2025-08-12 DIAGNOSIS — E78.5 HYPERLIPIDEMIA LDL GOAL <100: ICD-10-CM

## 2025-08-12 RX ORDER — LOSARTAN POTASSIUM 100 MG/1
TABLET ORAL
Qty: 90 TABLET | Refills: 0 | Status: SHIPPED | OUTPATIENT
Start: 2025-08-12

## 2025-08-12 RX ORDER — ATORVASTATIN CALCIUM 40 MG/1
40 TABLET, FILM COATED ORAL DAILY
Qty: 90 TABLET | Refills: 0 | Status: SHIPPED | OUTPATIENT
Start: 2025-08-12

## 2025-08-12 RX ORDER — TRIAMTERENE AND HYDROCHLOROTHIAZIDE 37.5; 25 MG/1; MG/1
1 TABLET ORAL DAILY
Qty: 90 TABLET | Refills: 0 | Status: SHIPPED | OUTPATIENT
Start: 2025-08-12